# Patient Record
Sex: MALE | Employment: OTHER | URBAN - METROPOLITAN AREA
[De-identification: names, ages, dates, MRNs, and addresses within clinical notes are randomized per-mention and may not be internally consistent; named-entity substitution may affect disease eponyms.]

---

## 2017-02-14 ENCOUNTER — TELEPHONE (OUTPATIENT)
Dept: MEDICAL GROUP | Age: 67
End: 2017-02-14

## 2017-02-14 DIAGNOSIS — K40.90 INGUINAL HERNIA OF RIGHT SIDE WITHOUT OBSTRUCTION OR GANGRENE: ICD-10-CM

## 2017-02-15 NOTE — TELEPHONE ENCOUNTER
Phone Number Called: [phone]    Message: Returned patient call regarding the referral he is requesting for hernia surgery. Left message for patient to call. Need further information for referral. Name of place in [place] and doctor.    Left Message for patient to call back: yes

## 2017-02-15 NOTE — TELEPHONE ENCOUNTER
1. Caller Name: Patient                               Call Back Number: [phone]      Patient approves a detailed voicemail message: yes    He would to like to wait until after April 15th to have surgery, he would like to schedule a consult after that time. He states he has no preference where he is sent as long as its in [place] or [place],also if possible he would like it done laparoscopically Please advise.

## 2017-02-16 NOTE — TELEPHONE ENCOUNTER
Phone Number Called: [phone] (home)     Message: Left message for the patient to call us back regarding the note below.      Left Message for patient to call back: yes

## 2017-02-16 NOTE — TELEPHONE ENCOUNTER
I tried to call patient. No one answer the phone. I need to know the type of hernia. For example inguinal hernia, left or right or Ventral hernia or umbilical hernia. I also needs to know which surgeon in [place] and the contact information of surgeon to refer. Or he is okay to refer to general surgeon in [place].    Thanks!    Yusra Cool M.D.

## 2017-02-17 NOTE — TELEPHONE ENCOUNTER
Phone Number Called: [phone] (home)     Message: called pt left message for pt to call back, [employer] message sent to pt information below.     Left Message for patient to call back: yes

## 2017-02-17 NOTE — TELEPHONE ENCOUNTER
Dr. Cool please review pts [employer] messages, pt sent 4 [employer] messages as the response.

## 2017-02-24 ENCOUNTER — TELEPHONE (OUTPATIENT)
Dept: MEDICAL GROUP | Age: 67
End: 2017-02-24

## 2017-02-24 NOTE — TELEPHONE ENCOUNTER
Phone Number Called: [phone] (home)     Message: Called left message for patient to bring his new insurance card by office so that we can have a recent insurance on file.    Left Message for patient to call back: yes

## 2017-02-27 ENCOUNTER — TELEPHONE (OUTPATIENT)
Dept: MEDICAL GROUP | Age: 67
End: 2017-02-27

## 2017-02-28 NOTE — TELEPHONE ENCOUNTER
Phone Number Called: [phone] (home)       Message: left message for patient he was asking for fax number.    Left Message for patient to call back: yes

## 2017-03-03 ENCOUNTER — HOSPITAL ENCOUNTER (OUTPATIENT)
Facility: MEDICAL CENTER | Age: 67
End: 2017-03-03
Attending: SURGERY | Admitting: SURGERY
Payer: COMMERCIAL

## 2017-03-03 VITALS
HEIGHT: 71 IN | RESPIRATION RATE: 16 BRPM | SYSTOLIC BLOOD PRESSURE: 133 MMHG | WEIGHT: 227.96 LBS | BODY MASS INDEX: 31.91 KG/M2 | HEART RATE: 80 BPM | DIASTOLIC BLOOD PRESSURE: 99 MMHG | OXYGEN SATURATION: 94 % | TEMPERATURE: 98 F

## 2017-03-03 DIAGNOSIS — E78.5 DYSLIPIDEMIA: ICD-10-CM

## 2017-03-03 PROBLEM — K40.20 BILATERAL INGUINAL HERNIA: Status: ACTIVE | Noted: 2017-03-03

## 2017-03-03 PROCEDURE — 160009 HCHG ANES TIME/MIN: Performed by: SURGERY

## 2017-03-03 PROCEDURE — 160047 HCHG PACU  - EA ADDL 30 MINS PHASE II: Performed by: SURGERY

## 2017-03-03 PROCEDURE — 160036 HCHG PACU - EA ADDL 30 MINS PHASE I: Performed by: SURGERY

## 2017-03-03 PROCEDURE — A4606 OXYGEN PROBE USED W OXIMETER: HCPCS | Performed by: SURGERY

## 2017-03-03 PROCEDURE — 500868 HCHG NEEDLE, SURGI(VARES): Performed by: SURGERY

## 2017-03-03 PROCEDURE — 502714 HCHG ROBOTIC SURGERY SERVICES: Performed by: SURGERY

## 2017-03-03 PROCEDURE — 503108 HCHG CANNULA SEAL 8.5-13MM: Performed by: SURGERY

## 2017-03-03 PROCEDURE — A9270 NON-COVERED ITEM OR SERVICE: HCPCS

## 2017-03-03 PROCEDURE — 160002 HCHG RECOVERY MINUTES (STAT): Performed by: SURGERY

## 2017-03-03 PROCEDURE — 700102 HCHG RX REV CODE 250 W/ 637 OVERRIDE(OP)

## 2017-03-03 PROCEDURE — 502240 HCHG MISC OR SUPPLY RC 0272: Performed by: SURGERY

## 2017-03-03 PROCEDURE — 700111 HCHG RX REV CODE 636 W/ 250 OVERRIDE (IP)

## 2017-03-03 PROCEDURE — 160035 HCHG PACU - 1ST 60 MINS PHASE I: Performed by: SURGERY

## 2017-03-03 PROCEDURE — 501838 HCHG SUTURE GENERAL: Performed by: SURGERY

## 2017-03-03 PROCEDURE — 110382 HCHG SHELL REV 271: Performed by: SURGERY

## 2017-03-03 PROCEDURE — C1781 MESH (IMPLANTABLE): HCPCS | Performed by: SURGERY

## 2017-03-03 PROCEDURE — 160041 HCHG SURGERY MINUTES - EA ADDL 1 MIN LEVEL 4: Performed by: SURGERY

## 2017-03-03 PROCEDURE — 110371 HCHG SHELL REV 272: Performed by: SURGERY

## 2017-03-03 PROCEDURE — 503366 HCHG TROCAR, 12X150 KII FIOS Z THR: Performed by: SURGERY

## 2017-03-03 PROCEDURE — 160029 HCHG SURGERY MINUTES - 1ST 30 MINS LEVEL 4: Performed by: SURGERY

## 2017-03-03 PROCEDURE — 160046 HCHG PACU - 1ST 60 MINS PHASE II: Performed by: SURGERY

## 2017-03-03 PROCEDURE — 700101 HCHG RX REV CODE 250

## 2017-03-03 PROCEDURE — 160048 HCHG OR STATISTICAL LEVEL 1-5: Performed by: SURGERY

## 2017-03-03 PROCEDURE — 160025 RECOVERY II MINUTES (STATS): Performed by: SURGERY

## 2017-03-03 DEVICE — MESH PROGRIP LAPROSCOPIC SELF FIXATING (1/CA): Type: IMPLANTABLE DEVICE | Status: FUNCTIONAL

## 2017-03-03 RX ORDER — MORPHINE SULFATE 10 MG/ML
1-4 INJECTION, SOLUTION INTRAMUSCULAR; INTRAVENOUS
Status: DISCONTINUED | OUTPATIENT
Start: 2017-03-03 | End: 2017-03-04 | Stop reason: HOSPADM

## 2017-03-03 RX ORDER — ATORVASTATIN CALCIUM 80 MG/1
80 TABLET, FILM COATED ORAL EVERY EVENING
Qty: 90 TAB | Refills: 3 | Status: SHIPPED | OUTPATIENT
Start: 2017-03-03 | End: 2018-04-03 | Stop reason: SDUPTHER

## 2017-03-03 RX ORDER — MAGNESIUM HYDROXIDE 1200 MG/15ML
LIQUID ORAL
Status: DISCONTINUED | OUTPATIENT
Start: 2017-03-03 | End: 2017-03-03 | Stop reason: HOSPADM

## 2017-03-03 RX ORDER — BUPIVACAINE HYDROCHLORIDE AND EPINEPHRINE 5; 5 MG/ML; UG/ML
INJECTION, SOLUTION EPIDURAL; INTRACAUDAL; PERINEURAL
Status: DISCONTINUED | OUTPATIENT
Start: 2017-03-03 | End: 2017-03-03 | Stop reason: HOSPADM

## 2017-03-03 RX ORDER — KETOROLAC TROMETHAMINE 30 MG/ML
30 INJECTION, SOLUTION INTRAMUSCULAR; INTRAVENOUS EVERY 6 HOURS
Status: DISCONTINUED | OUTPATIENT
Start: 2017-03-03 | End: 2017-03-04 | Stop reason: HOSPADM

## 2017-03-03 RX ORDER — ACETAMINOPHEN 500 MG
1000 TABLET ORAL
COMMUNITY
End: 2018-04-02

## 2017-03-03 RX ORDER — MEPERIDINE HYDROCHLORIDE 25 MG/ML
INJECTION INTRAMUSCULAR; INTRAVENOUS; SUBCUTANEOUS
Status: COMPLETED
Start: 2017-03-03 | End: 2017-03-03

## 2017-03-03 RX ORDER — OXYCODONE HYDROCHLORIDE AND ACETAMINOPHEN 5; 325 MG/1; MG/1
1-2 TABLET ORAL EVERY 4 HOURS PRN
Status: DISCONTINUED | OUTPATIENT
Start: 2017-03-03 | End: 2017-03-04 | Stop reason: HOSPADM

## 2017-03-03 RX ORDER — OXYCODONE HYDROCHLORIDE AND ACETAMINOPHEN 5; 325 MG/1; MG/1
TABLET ORAL
Status: COMPLETED
Start: 2017-03-03 | End: 2017-03-03

## 2017-03-03 RX ORDER — MIDAZOLAM HYDROCHLORIDE 1 MG/ML
INJECTION INTRAMUSCULAR; INTRAVENOUS
Status: DISCONTINUED
Start: 2017-03-03 | End: 2017-03-04 | Stop reason: HOSPADM

## 2017-03-03 RX ORDER — LIDOCAINE HYDROCHLORIDE 10 MG/ML
INJECTION, SOLUTION INFILTRATION; PERINEURAL
Status: COMPLETED
Start: 2017-03-03 | End: 2017-03-03

## 2017-03-03 RX ORDER — ONDANSETRON 2 MG/ML
4 INJECTION INTRAMUSCULAR; INTRAVENOUS
Status: DISCONTINUED | OUTPATIENT
Start: 2017-03-03 | End: 2017-03-04 | Stop reason: HOSPADM

## 2017-03-03 RX ORDER — SODIUM CHLORIDE, SODIUM LACTATE, POTASSIUM CHLORIDE, CALCIUM CHLORIDE 600; 310; 30; 20 MG/100ML; MG/100ML; MG/100ML; MG/100ML
1000 INJECTION, SOLUTION INTRAVENOUS
Status: COMPLETED | OUTPATIENT
Start: 2017-03-03 | End: 2017-03-03

## 2017-03-03 RX ADMIN — SODIUM CHLORIDE, SODIUM LACTATE, POTASSIUM CHLORIDE, CALCIUM CHLORIDE 1000 ML: 600; 310; 30; 20 INJECTION, SOLUTION INTRAVENOUS at 15:05

## 2017-03-03 RX ADMIN — LIDOCAINE HYDROCHLORIDE: 10 INJECTION, SOLUTION INFILTRATION; PERINEURAL at 15:05

## 2017-03-03 RX ADMIN — MEPERIDINE HYDROCHLORIDE 25 MG: 25 INJECTION INTRAMUSCULAR; INTRAVENOUS; SUBCUTANEOUS at 20:15

## 2017-03-03 RX ADMIN — OXYCODONE AND ACETAMINOPHEN 2 TABLET: 5; 325 TABLET ORAL at 21:15

## 2017-03-03 ASSESSMENT — PAIN SCALES - GENERAL
PAINLEVEL_OUTOF10: 4
PAINLEVEL_OUTOF10: 2
PAINLEVEL_OUTOF10: 3
PAINLEVEL_OUTOF10: 3
PAINLEVEL_OUTOF10: 5
PAINLEVEL_OUTOF10: 4

## 2017-03-03 NOTE — TELEPHONE ENCOUNTER
Was the patient seen in the last year in this department? Yes 12/19/2016, next appt 4/19/2017    Does patient have an active prescription for medications requested? Yes     Received Request Via: Pharmacy

## 2017-03-03 NOTE — IP AVS SNAPSHOT
3/3/2017          Lopez Davison [address]  [place] [address]    Dear Lopez:    [facility] wants to ensure your discharge home is safe and you or your loved ones have had all your questions answered regarding your care after you leave the hospital.    You may receive a telephone call within two days of your discharge.  This call is to make certain you understand your discharge instructions as well as ensure we provided you with the best care possible during your stay with us.     The call will only last approximately 3-5 minutes and will be done by a nurse.    Once again, we want to ensure your discharge home is safe and that you have a clear understanding of any next steps in your care.  If you have any questions or concerns, please do not hesitate to contact us, we are here for you.  Thank you for choosing [facility] for your healthcare needs.    Sincerely,    Grover Santana    [facility]

## 2017-03-03 NOTE — IP AVS SNAPSHOT
" Home Care Instructions                                                                                                                Name:Lopez Calvin  Medical Record Number:[id number]  CSN: [id number]    YOB: 1950   Age: 66 y.o.  Sex: male  HT:1.803 m (5' 11\") WT: 103.4 kg (227 lb 15.3 oz)          Admit Date: 3/3/2017     Discharge Date:   Today's Date: 3/3/2017  Attending Doctor:  Chmap Garber M.D.                  Allergies:  Tamiflu                Discharge Instructions         ACTIVITY: Rest and take it easy for the first 24 hours.  A responsible adult is recommended to remain with you during that time.  It is normal to feel sleepy.  We encourage you to not do anything that requires balance, judgment or coordination.    MILD FLU-LIKE SYMPTOMS ARE NORMAL. YOU MAY EXPERIENCE GENERALIZED MUSCLE ACHES, THROAT IRRITATION, HEADACHE AND/OR SOME NAUSEA.    FOR 24 HOURS DO NOT:  Drive, operate machinery or run household appliances.  Drink beer or alcoholic beverages.   Make important decisions or sign legal documents.    SPECIAL INSTRUCTIONS: *    *Hernia Repair  Care After  These instructions give you information on caring for yourself after your procedure. Your doctor may also give you more specific instructions. Call your doctor if you have any problems or questions after your procedure.  HOME CARE   · You may have changes in your poops (bowel movements).  · You may have loose or watery poop (diarrhea).  · You may be not able to poop.  · Your bowels will slowly get back to normal.  · Do not eat any food that makes you sick to your stomach (nauseous). Eat small meals 4 to 6 times a day instead of 3 large ones.  · Do not drink pop. It will give you gas.  · Do not drink alcohol.  · Do not lift anything heavier than 10 pounds. This is about the weight of a gallon of milk.  · Do not do anything that makes you very tired for at least 6 weeks.  · Do not get your wound wet for 2 days.  · You may take a " sponge bath during this time.  · After 2 days you may take a shower. Gently pat your surgical cut (incision) dry with a towel. Do not rub it.  · For men: You may have been given an athletic supporter (scrotal support) before you left the hospital. It holds your scrotum and testicles closer to your body so there is no strain on your wound. Wear the supporter until your doctor tells you that you do not need it anymore.  GET HELP RIGHT AWAY IF:  · You have watery poop, or cannot poop for more than 3 days.  · You feel sick to your stomach or throw up (vomit) more than 2 or 3 times.  · You have temperature by mouth above 102° F (38.9° C).  · You see redness or puffiness (swelling) around your wound.  · You see yellowish white fluid (pus) coming from your wound.  · You see a bulge or bump in your lower belly (abdomen) or near your groin.  · You develop a rash, trouble breathing, or any other symptoms from medicines taken.  MAKE SURE YOU:  · Understand these instructions.  · Will watch your condition.  · Will get help right away if your are not doing well or get worse.  Document Released: 11/30/2009 Document Revised: 03/11/2013 Document Reviewed: 11/30/2009  [employer]® Patient Information ©2014 [employer].  *    DIET: To avoid nausea, slowly advance diet as tolerated, avoiding spicy or greasy foods for the first day.  Add more substantial food to your diet according to your physician's instructions.  Babies can be fed formula or breast milk as soon as they are hungry.  INCREASE FLUIDS AND FIBER TO AVOID CONSTIPATION.    SURGICAL DRESSING/BATHING: *no tub baths or soaking until your surgeon gives approval.**    FOLLOW-UP APPOINTMENT:  A follow-up appointment should be arranged with your doctor.  Call to schedule.    You should CALL YOUR PHYSICIAN if you develop:  Fever greater than 101 degrees F.  Pain not relieved by medication, or persistent nausea or vomiting.  Excessive bleeding (blood soaking through dressing) or  unexpected drainage from the wound.  Extreme redness or swelling around the incision site, drainage of pus or foul smelling drainage.  Inability to urinate or empty your bladder within 8 hours.  Problems with breathing or chest pain.    You should call 911 if you develop problems with breathing or chest pain.  If you are unable to contact your doctor or surgical center, you should go to the nearest emergency room or urgent care center.  Physician's telephone #: **Dr. Garber [phone]*    If any questions arise, call your doctor.  If your doctor is not available, please feel free to call the Surgical Center at [phone].  The Center is open Monday through Friday from 7AM to 7PM.  You can also call the HEALTH HOTLINE open 24 hours/day, 7 days/week and speak to a nurse at [phone], or toll free at [phone].    A registered nurse may call you a few days after your surgery to see how you are doing after your procedure.    MEDICATIONS: Resume taking daily medication.  Take prescribed pain medication with food.  If no medication is prescribed, you may take non-aspirin pain medication if needed.  PAIN MEDICATION CAN BE VERY CONSTIPATING.  Take a stool softener or laxative such as senokot, pericolace, or milk of magnesia if needed.    Prescription given for **Oxycodone*.  Last pain medication given at *9:05 pm**.    If your physician has prescribed pain medication that includes Acetaminophen (Tylenol), do not take additional Acetaminophen (Tylenol) while taking the prescribed medication.    Depression / Suicide Risk    As you are discharged from this [facility] facility, it is important to learn how to keep safe from harming yourself.    Recognize the warning signs:  · Abrupt changes in personality, positive or negative- including increase in energy   · Giving away possessions  · Change in eating patterns- significant weight changes-  positive or negative  · Change in sleeping patterns- unable to sleep  or sleeping all the time   · Unwillingness or inability to communicate  · Depression  · Unusual sadness, discouragement and loneliness  · Talk of wanting to die  · Neglect of personal appearance   · Rebelliousness- reckless behavior  · Withdrawal from people/activities they love  · Confusion- inability to concentrate     If you or a loved one observes any of these behaviors or has concerns about self-harm, here's what you can do:  · Talk about it- your feelings and reasons for harming yourself  · Remove any means that you might use to hurt yourself (examples: pills, rope, extension cords, firearm)  · Get professional help from the community (Mental Health, Substance Abuse, psychological counseling)  · Do not be alone:Call your Safe Contact- someone whom you trust who will be there for you.  · Call your local CRISIS HOTLINE 973-2231 or 371-663-7587  · Call your local Children's Mobile Crisis Response Team [place] (796) 409-2313 or www.HealthSouk  · Call the toll free National Suicide Prevention Hotlines   · National Suicide Prevention Lifeline 600-301-JGPW (5625)  · National Hope Line Network 800-SUICIDE (181-7099)       Medication List      ASK your doctor about these medications        Instructions    acetaminophen 500 MG Tabs   Commonly known as:  TYLENOL    Take 1,000 mg by mouth 1 time daily as needed.   Dose:  1000 mg       aspirin EC 81 MG Tbec   Commonly known as:  ECOTRIN    Take 81 mg by mouth every 48 hours.   Dose:  81 mg       atorvastatin 80 MG tablet   Commonly known as:  LIPITOR    Take 1 Tab by mouth every evening.   Dose:  80 mg       GERITOL PO    Take 1 Tab by mouth every 48 hours.   Dose:  1 Tab       levothyroxine 100 MCG Tabs   Commonly known as:  SYNTHROID    Take 1 Tab by mouth Every morning on an empty stomach.   Dose:  100 mcg       multivitamin Tabs    Take 1 Tab by mouth every 48 hours.   Dose:  1 Tab       NEXIUM 20 MG capsule   Generic drug:  esomeprazole    Take 40 mg by  mouth every morning before breakfast.   Dose:  40 mg       VITAMIN E PO    Take 1 Tab by mouth every 48 hours.   Dose:  1 Tab               Medication Information     Above and/or attached are the medications your physician expects you to take upon discharge. Review all of your home medications and newly ordered medications with your doctor and/or pharmacist. Follow medication instructions as directed by your doctor and/or pharmacist. Please keep your medication list with you and share with your physician. Update the information when medications are discontinued, doses are changed, or new medications (including over-the-counter products) are added; and carry medication information at all times in the event of emergency situations.        Resources     Quit Smoking / Tobacco Use:    I understand the use of any tobacco products increases my chance of suffering from future heart disease or stroke and could cause other illnesses which may shorten my life. Quitting the use of tobacco products is the single most important thing I can do to improve my health. For further information on smoking / tobacco cessation call a Toll Free Quit Line at 1-970.170.3055 (*National Cancer [place]) or 1-797.715.2832 (American Lung Association) or you can access the web based program at www.lungusa.org.    [place] Tobacco Users Help Line:  (636) 215-7779       Toll Free: 1-363.122.1059  Quit Tobacco Program [facility] Management Services [phone]    Crisis Hotline:    [place] Crisis Hotline:  1-759-BWHPPTK or 1-554.622.6435    [place] Crisis Hotline:    1-320.938.7547 or 603-921-9737    Discharge Survey:   Thank you for choosing [facility]. We hope we did everything we could to make your hospital stay a pleasant one. You may be receiving a survey and we would appreciate your time and participation in answering the questions. Your input is very valuable to us in our efforts to improve our service to our patients and their  families.            Signatures     My signature on this form indicates that:    1. I acknowledge receipt and understanding of these Home Care Instruction.  2. My questions regarding this information have been answered to my satisfaction.  3. I have formulated a plan with my discharge nurse to obtain my prescribed medications for home.    __________________________________      __________________________________                   Patient Signature                                 Guardian/Responsible Adult Signature      __________________________________                 __________       ________                       Nurse Signature                                               Date                 Time

## 2017-03-04 NOTE — OR NURSING
"2120 PT AWAKE ALERT REPORTS MINIMAL DISCOMFORT '' NEED TO URINATE\" PT UNABLE TO VOID WITH URINAL. PT TAKEN TO RESTROOM AND UNABLE TO VOID FOR 10 MINUTES. PT RETURNED TO BED AND PROVIDED CALL BELL AND URINAL. RN ON PACU 2 INFORMED. PT V/U AND AGREEMENT. BLADDER ASSESSED AND NOT DISTENDED. DENIES PAIN TO LIGHT PALPATION.   "

## 2017-03-04 NOTE — DISCHARGE INSTRUCTIONS
ACTIVITY: Rest and take it easy for the first 24 hours.  A responsible adult is recommended to remain with you during that time.  It is normal to feel sleepy.  We encourage you to not do anything that requires balance, judgment or coordination.    MILD FLU-LIKE SYMPTOMS ARE NORMAL. YOU MAY EXPERIENCE GENERALIZED MUSCLE ACHES, THROAT IRRITATION, HEADACHE AND/OR SOME NAUSEA.    FOR 24 HOURS DO NOT:  Drive, operate machinery or run household appliances.  Drink beer or alcoholic beverages.   Make important decisions or sign legal documents.    SPECIAL INSTRUCTIONS: *    *Hernia Repair  Care After  These instructions give you information on caring for yourself after your procedure. Your doctor may also give you more specific instructions. Call your doctor if you have any problems or questions after your procedure.  HOME CARE   · You may have changes in your poops (bowel movements).  · You may have loose or watery poop (diarrhea).  · You may be not able to poop.  · Your bowels will slowly get back to normal.  · Do not eat any food that makes you sick to your stomach (nauseous). Eat small meals 4 to 6 times a day instead of 3 large ones.  · Do not drink pop. It will give you gas.  · Do not drink alcohol.  · Do not lift anything heavier than 10 pounds. This is about the weight of a gallon of milk.  · Do not do anything that makes you very tired for at least 6 weeks.  · Do not get your wound wet for 2 days.  · You may take a sponge bath during this time.  · After 2 days you may take a shower. Gently pat your surgical cut (incision) dry with a towel. Do not rub it.  · For men: You may have been given an athletic supporter (scrotal support) before you left the hospital. It holds your scrotum and testicles closer to your body so there is no strain on your wound. Wear the supporter until your doctor tells you that you do not need it anymore.  GET HELP RIGHT AWAY IF:  · You have watery poop, or cannot poop for more than 3  days.  · You feel sick to your stomach or throw up (vomit) more than 2 or 3 times.  · You have temperature by mouth above 102° F (38.9° C).  · You see redness or puffiness (swelling) around your wound.  · You see yellowish white fluid (pus) coming from your wound.  · You see a bulge or bump in your lower belly (abdomen) or near your groin.  · You develop a rash, trouble breathing, or any other symptoms from medicines taken.  MAKE SURE YOU:  · Understand these instructions.  · Will watch your condition.  · Will get help right away if your are not doing well or get worse.  Document Released: 11/30/2009 Document Revised: 03/11/2013 Document Reviewed: 11/30/2009  [employer]® Patient Information ©2014 [employer].  *    DIET: To avoid nausea, slowly advance diet as tolerated, avoiding spicy or greasy foods for the first day.  Add more substantial food to your diet according to your physician's instructions.  Babies can be fed formula or breast milk as soon as they are hungry.  INCREASE FLUIDS AND FIBER TO AVOID CONSTIPATION.    SURGICAL DRESSING/BATHING: *no tub baths or soaking until your surgeon gives approval.**    FOLLOW-UP APPOINTMENT:  A follow-up appointment should be arranged with your doctor.  Call to schedule.    You should CALL YOUR PHYSICIAN if you develop:  Fever greater than 101 degrees F.  Pain not relieved by medication, or persistent nausea or vomiting.  Excessive bleeding (blood soaking through dressing) or unexpected drainage from the wound.  Extreme redness or swelling around the incision site, drainage of pus or foul smelling drainage.  Inability to urinate or empty your bladder within 8 hours.  Problems with breathing or chest pain.    You should call 911 if you develop problems with breathing or chest pain.  If you are unable to contact your doctor or surgical center, you should go to the nearest emergency room or urgent care center.  Physician's telephone #: **Dr. Garber [phone]*    If any  questions arise, call your doctor.  If your doctor is not available, please feel free to call the Surgical Center at [phone].  The Center is open Monday through Friday from 7AM to 7PM.  You can also call the HEALTH HOTLINE open 24 hours/day, 7 days/week and speak to a nurse at [phone], or toll free at [phone].    A registered nurse may call you a few days after your surgery to see how you are doing after your procedure.    MEDICATIONS: Resume taking daily medication.  Take prescribed pain medication with food.  If no medication is prescribed, you may take non-aspirin pain medication if needed.  PAIN MEDICATION CAN BE VERY CONSTIPATING.  Take a stool softener or laxative such as senokot, pericolace, or milk of magnesia if needed.    Prescription given for **Oxycodone*.  Last pain medication given at *9:05 pm**.    If your physician has prescribed pain medication that includes Acetaminophen (Tylenol), do not take additional Acetaminophen (Tylenol) while taking the prescribed medication.    Depression / Suicide Risk    As you are discharged from this [facility] facility, it is important to learn how to keep safe from harming yourself.    Recognize the warning signs:  · Abrupt changes in personality, positive or negative- including increase in energy   · Giving away possessions  · Change in eating patterns- significant weight changes-  positive or negative  · Change in sleeping patterns- unable to sleep or sleeping all the time   · Unwillingness or inability to communicate  · Depression  · Unusual sadness, discouragement and loneliness  · Talk of wanting to die  · Neglect of personal appearance   · Rebelliousness- reckless behavior  · Withdrawal from people/activities they love  · Confusion- inability to concentrate     If you or a loved one observes any of these behaviors or has concerns about self-harm, here's what you can do:  · Talk about it- your feelings and reasons for harming  yourself  · Remove any means that you might use to hurt yourself (examples: pills, rope, extension cords, firearm)  · Get professional help from the community (Mental Health, Substance Abuse, psychological counseling)  · Do not be alone:Call your Safe Contact- someone whom you trust who will be there for you.  · Call your local CRISIS HOTLINE 536-3583 or 572-660-0769  · Call your local Children's Mobile Crisis Response Team [place] (158) 646-6859 or www.[employer]  · Call the toll free National Suicide Prevention Hotlines   · National Suicide Prevention Lifeline 905-504-QPIA (9804)  · National Hope Line Network 800-SUICIDE (045-7452)

## 2017-03-04 NOTE — OR NURSING
Bladder scanned for 90cc.  Pt instructed to drink and call MD if if he can't urinate within 4 hours from now

## 2017-03-04 NOTE — OP REPORT
DATE OF SERVICE:  03/03/2017    PREOPERATIVE DIAGNOSIS:  Bilateral inguinal hernias.    POSTOPERATIVE DIAGNOSIS:  Bilateral indirect inguinal hernias, ____.    OPERATIONS PERFORMED:  Robotic-assisted laparoscopic transperitoneal repair   and bilateral inguinal hernias with ProGrip mesh implantation.    SURGEON:  Champ Garber M.D.    ANESTHESIOLOGIST:  Manolo Carreon M.D.    ANESTHESIA:  General anesthesia.    OPERATIVE NOTE:  The patient is a 66 years of age presents with bilateral   inguinal hernias.  He was felt to be a candidate for elective repair.  The   risks and possible complications of operation were carefully explained to him   in detail.  He understood and accepted these risks and wished to proceed.  He   had a satisfactory preoperative evaluation.  He was taken to the operating   room and placed under anesthesia by Dr. Carreon.  Once anesthetized, his   abdomen was shaved and prepped with ChloraPrep solution and sterile drapes   were applied.  A time-out was affected.  Prophylactic antibiotics were   administered and sequential stockings had been applied as antiembolism   prophylaxis.  Patient had a solution of 0.5% Marcaine with epinephrine   infiltrated in all wounds.  Patient had an infiltration in the left upper   quadrant first.  Incision was made here and the fascia was elevated.  The   patient had a Veress needle inserted.  Saline drop test was permissive to   proceed with step pneumo insufflation.  Once pneumo insufflated, an 8 mm   trocar was placed and then replaced by a 15 mm trocar.  The abdomen was   surveyed.  The patient had no significant abdominal adhesions from previous   appendectomy.  He did have bilateral inguinal hernias.  The patient had an   epigastric 12 mm trocar placed and then 8 mm trocars placed in the right upper   quadrant.  He was placed in Trendelenburg.  The robot was brought in and   docked.  Instrumentation was introduced.  Dr. Garber retired to the console.     Patient had an incision made in the peritoneum on the left.  The hernia   contained the colon.  The patient had the peritoneum opened across the pelvic   outlet above the level of the hernias.  A pocket was then created using   countertraction electrocautery.  Dissection was carried down and medially   across Geronimo's ligament and then laterally to the psoas muscle.  The patient   gradually had reduction of the indirect sac along the internal ring.  This was    from the cord structures.  Electrocautery ____ for hemostasis.  The   cord structures were spared.  There were allowed to lie flat against the   floor of the abdominal wall.  The patient gradually had this pocket completely   developed.  A similar dissection was carried out on the right.  Again, he had   an indirect hernia.  The sac was reduced.  The dissection proceeded   transperitoneal into the preperitoneal space to the anatomic boundaries as   noted on the left.  Once both dissections had been completed ProGrip mesh was   inserted, unfurled and positioned across the pelvic outlet on the left.  Once   satisfactorily positioned, the peritoneum was closed using running 2-0   Stratafix in 2 layers.  The needle from the Stratafix was retrieved.  A second   mesh was inserted on the right.  This was unfurled and also deployed across   pelvic outlet.  The peritoneum was then closed using a 2-0 Stratafix suture in   2 layers.  The patient had the needle then retrieved.  The patient had the   robot undocked.  The patient had the 15 mm trocar and 12 mm trocar site closed   using an Endoclose device and 0 Vicryl suture in the fascia.  Once closed,   pneumoperitoneum was collapsed.  The wounds were irrigated.  The skin was   closed using running 4-0 Monocryl subcuticular and then sealed with Dermabond.    The patient was awakened, extubated, and taken to recovery room in stable   satisfactory condition.  He was given a prescription for oxycodone IR 5 mg    #40.  He was asked to return to see me by next week.  He will be treated on an   ambulatory basis provided he meets discharge criteria at this hour.  The   patient has had an estimated blood loss around 10 mL.  Sponge, instrument, and   needle counts were reported correct.  There were no intraoperative   complications.       ____________________________________     MD MYRTLE SEO / ANA    DD:  03/03/2017 20:06:12  DT:  03/03/2017 21:41:04    D#:  [id number]  Job#:  [id number]    cc: EMIGDIO AGUILAR MD

## 2017-03-04 NOTE — OR SURGEON
Immediate Post-Operative Note      PreOp Diagnosis: bih    PostOp Diagnosis: same    Procedure(s):  INGUINAL HERNIA REPAIR ROBOTIC W/MESH - Wound Class: Clean progrip     Surgeon(s):  Champ Garber M.D.    Anesthesiologist/Type of Anesthesia:  Anesthesiologist: Manolo Carreon M.D./General    Surgical Staff:  Circulator: Ryder Flores R.N.; Juani Salmon R.N.  Relief Circulator: Heaven Santana R.N.  Scrub Person: Faisal Mcneil    Specimen: 0    Estimated Blood Loss: 10    Findings: indirect .left slider    Complications: 0        3/3/2017 8:00 PM Champ Garber

## 2017-03-31 DIAGNOSIS — E03.9 HYPOTHYROIDISM, UNSPECIFIED: ICD-10-CM

## 2017-03-31 RX ORDER — LEVOTHYROXINE SODIUM 0.1 MG/1
100 TABLET ORAL
Qty: 90 TAB | Refills: 3 | Status: SHIPPED | OUTPATIENT
Start: 2017-03-31 | End: 2018-01-09 | Stop reason: SDUPTHER

## 2017-03-31 NOTE — TELEPHONE ENCOUNTER
Pt is requesting refill be sent to mail order.    Was the patient seen in the last year in this department? Yes 12/19/2016    Does patient have an active prescription for medications requested? Yes     Received Request Via: Patient

## 2018-01-09 DIAGNOSIS — E03.8 OTHER SPECIFIED HYPOTHYROIDISM: ICD-10-CM

## 2018-01-09 RX ORDER — LEVOTHYROXINE SODIUM 0.1 MG/1
100 TABLET ORAL
Qty: 90 TAB | Refills: 3 | Status: SHIPPED | OUTPATIENT
Start: 2018-01-09 | End: 2018-04-03 | Stop reason: SDUPTHER

## 2018-01-09 NOTE — TELEPHONE ENCOUNTER
Was the patient seen in the last year in this department? No      Last seen 12/29/2016    Does patient have an active prescription for medications requested? No     Received Request Via: Patient

## 2018-03-16 ENCOUNTER — PATIENT MESSAGE (OUTPATIENT)
Dept: MEDICAL GROUP | Age: 68
End: 2018-03-16

## 2018-04-02 ENCOUNTER — OFFICE VISIT (OUTPATIENT)
Dept: MEDICAL GROUP | Age: 68
End: 2018-04-02
Payer: MEDICARE

## 2018-04-02 VITALS
WEIGHT: 230 LBS | HEART RATE: 94 BPM | DIASTOLIC BLOOD PRESSURE: 80 MMHG | SYSTOLIC BLOOD PRESSURE: 140 MMHG | BODY MASS INDEX: 32.2 KG/M2 | TEMPERATURE: 97.1 F | OXYGEN SATURATION: 90 % | HEIGHT: 71 IN

## 2018-04-02 DIAGNOSIS — K21.9 GASTROESOPHAGEAL REFLUX DISEASE WITHOUT ESOPHAGITIS: ICD-10-CM

## 2018-04-02 DIAGNOSIS — E03.8 OTHER SPECIFIED HYPOTHYROIDISM: ICD-10-CM

## 2018-04-02 DIAGNOSIS — R73.01 IFG (IMPAIRED FASTING GLUCOSE): ICD-10-CM

## 2018-04-02 DIAGNOSIS — Z12.11 SCREENING FOR COLON CANCER: ICD-10-CM

## 2018-04-02 DIAGNOSIS — E78.5 DYSLIPIDEMIA: ICD-10-CM

## 2018-04-02 DIAGNOSIS — E66.9 OBESITY (BMI 30.0-34.9): ICD-10-CM

## 2018-04-02 PROBLEM — K40.20 BILATERAL INGUINAL HERNIA: Status: RESOLVED | Noted: 2017-03-03 | Resolved: 2018-04-02

## 2018-04-02 PROCEDURE — 99214 OFFICE O/P EST MOD 30 MIN: CPT | Performed by: INTERNAL MEDICINE

## 2018-04-02 NOTE — PROGRESS NOTES
Subjective:   Lopez Calvin is a 67 y.o. male here today for evaluation and management of:      IFG (impaired fasting glucose)  Patient is noncompliance with diet and exercise. He still has elevated fasting glucose at 109.    Hypothyroidism, unspecified  He is taking levothyroxine 100 µg every morning. His thyroid function tests within normal. He is clinically euthyroid state.    Thyroid function test done in [facility] on 3/28/18 showed TSH 0.83, free T4, 1.6.    Gastroesophageal reflux disease without esophagitis  Patient states that he is taking Nexium 20 mg twice a day. He is not able to stop taking Nexium as he has severe acid reflux. He drinks one shot of whiskey every evening. I advised patient to avoid acidic food and alcohol. Patient is advised to cut down Nexium to once a day if he tolerates. I have reviewed the potential side effects of chronic use of PPI. Patient is advised to take vitamin D, B12 and magnesium if he needs to continue taking PPI chronically.    Dyslipidemia  Patient is currently taking atorvastatin 80 mg every evening. He denies side effects from taking it. His liver enzymes on recent blood tests on 3/28/18 are within normal. His LDL cholesterol was 82, but triglyceride remains elevated at 162.    Lipid panel on 3/28/18: Total cholesterol 152, HDL 43, triglycerides 162, LDL 82.          Current medicines (including changes today)  Current Outpatient Prescriptions   Medication Sig Dispense Refill   • levothyroxine (SYNTHROID) 100 MCG Tab Take 1 Tab by mouth Every morning on an empty stomach. 90 Tab 3   • atorvastatin (LIPITOR) 80 MG tablet Take 1 Tab by mouth every evening. 90 Tab 3   • aspirin EC (ECOTRIN) 81 MG Tablet Delayed Response Take 81 mg by mouth every 48 hours.     • esomeprazole (NEXIUM) 20 MG capsule Take 40 mg by mouth every morning before breakfast.     • multivitamin (THERAGRAN) Tab Take 1 Tab by mouth every 48 hours.     • Iron-Vitamins (GERITOL PO) Take 1 Tab by mouth  "every 48 hours.     • VITAMIN E PO Take 1 Tab by mouth every 48 hours.       No current facility-administered medications for this visit.      He  has a past medical history of Anesthesia; Arthritis; Cataract; Disorder of thyroid; GERD (gastroesophageal reflux disease); Heart burn; Hiatus hernia syndrome; Hyperlipidemia; Indigestion; Pneumonia (1990); Sleep apnea; and Snoring.    ROS   No chest pain, no shortness of breath, no abdominal pain       Objective:     Blood pressure 140/80, pulse 94, temperature 36.2 °C (97.1 °F), height 1.791 m (5' 10.5\"), weight 104.3 kg (230 lb), SpO2 90 %. Body mass index is 32.54 kg/m².   Physical Exam:  General: Alert, oriented and no acute distress.  Eye contact is good, speech goal directed, affect calm  HEENT: conjunctiva non-injected, sclera non-icteric.  Oral mucous membranes pink and moist with no lesions.  Pinna normal.   Lungs: Normal respiratory effort, clear to auscultation bilaterally with good excursion.  CV: regular rate and rhythm. No murmurs.   Abdomen: soft, non distended, nontender, Bowel sound normal.  Ext: no edema, color normal, vascularity normal, temperature normal        Assessment and Plan:   The following treatment plan was discussed     1. IFG (impaired fasting glucose)  - Stable. I advised patient to try to avoid eating processed sugar and simple carbohydrate. Patient is advised to do regular physical exercise 3-5 times a week.  - COMP METABOLIC PANEL; Future  - HEMOGLOBIN A1C; Future    2. Other specified hypothyroidism  - Well-controlled. Continue current regimens. Recheck lab 1-2 weeks before next follow up visit.  - TSH; Future  - FREE THYROXINE; Future    3. Gastroesophageal reflux disease without esophagitis  - Chronic and stable. We discussed to avoid acidic food and alcohol. Patient is advised to take vitamin B12, magnesium, vitamin D if he is chronically taking PPI. I also reviewed potential side effects of chronic use of PPI with him.  - Patient " is advised to decrease the dose of Nexium from 20 mg twice a day to 20 mg daily if he tolerates and symptoms improve.  - REFERRAL TO GASTROENTEROLOGY    4. Dyslipidemia  - LDL at goal. Triglycerides remain high. Continue atorvastatin 80 mg once every evening. Review potential side effects of atorvastatin with patient.  - Advised to eat low fat, low carbohydrate and high fiber diet as well as do cardio physical exercise regularly.   - Advised to take omega-3 once a day.  - COMP METABOLIC PANEL; Future  - LIPID PROFILE; Future    5. Screening for colon cancer  - Refer to GI for colon cancer screening.  - REFERRAL TO GASTROENTEROLOGY    6. Obesity (BMI 30.0-34.9)  - Counseled for healthy diet and regular physical exercise to lose weight.   - Patient identified as having weight management issue.  Appropriate orders and counseling given.        Followup: Return in about 6 months (around 10/2/2018), or if symptoms worsen or fail to improve, for annual wellness visit.      Please note that this dictation was created using voice recognition software. I have made every reasonable attempt to correct obvious errors, but I expect that there may have unintended errors in text, spelling, punctuation, or grammar that I did not discover.

## 2018-04-02 NOTE — ASSESSMENT & PLAN NOTE
He is taking levothyroxine 100 µg every morning. His thyroid function tests within normal. He is clinically euthyroid state.    Thyroid function test done in [facility] on 3/28/18 showed TSH 0.83, free T4, 1.6.

## 2018-04-02 NOTE — ASSESSMENT & PLAN NOTE
Patient is currently taking atorvastatin 80 mg every evening. He denies side effects from taking it. His liver enzymes on recent blood tests on 3/28/18 are within normal. His LDL cholesterol was 82, but triglyceride remains elevated at 162.    Lipid panel on 3/28/18: Total cholesterol 152, HDL 43, triglycerides 162, LDL 82.

## 2018-04-02 NOTE — ASSESSMENT & PLAN NOTE
Patient states that he is taking Nexium 20 mg twice a day. He is not able to stop taking Nexium as he has severe acid reflux. He drinks one shot of whiskey every evening. I advised patient to avoid acidic food and alcohol. Patient is advised to cut down Nexium to once a day if he tolerates. I have reviewed the potential side effects of chronic use of PPI. Patient is advised to take vitamin D, B12 and magnesium if he needs to continue taking PPI chronically.

## 2018-04-03 DIAGNOSIS — E03.8 OTHER SPECIFIED HYPOTHYROIDISM: ICD-10-CM

## 2018-04-03 DIAGNOSIS — E78.5 DYSLIPIDEMIA: ICD-10-CM

## 2018-04-03 RX ORDER — LEVOTHYROXINE SODIUM 0.1 MG/1
100 TABLET ORAL
Qty: 90 TAB | Refills: 3 | Status: SHIPPED | OUTPATIENT
Start: 2018-04-03 | End: 2019-05-20 | Stop reason: SDUPTHER

## 2018-04-03 RX ORDER — ATORVASTATIN CALCIUM 80 MG/1
80 TABLET, FILM COATED ORAL EVERY EVENING
Qty: 90 TAB | Refills: 3 | Status: SHIPPED | OUTPATIENT
Start: 2018-04-03 | End: 2019-05-11 | Stop reason: SDUPTHER

## 2019-01-24 ENCOUNTER — PATIENT MESSAGE (OUTPATIENT)
Dept: MEDICAL GROUP | Age: 69
End: 2019-01-24

## 2019-05-07 ENCOUNTER — PATIENT MESSAGE (OUTPATIENT)
Dept: MEDICAL GROUP | Age: 69
End: 2019-05-07

## 2019-05-07 DIAGNOSIS — R73.01 IFG (IMPAIRED FASTING GLUCOSE): ICD-10-CM

## 2019-05-07 DIAGNOSIS — E78.00 HYPERCHOLESTEROLEMIA: ICD-10-CM

## 2019-05-07 DIAGNOSIS — E03.9 HYPOTHYROIDISM, UNSPECIFIED TYPE: ICD-10-CM

## 2019-05-08 NOTE — PATIENT COMMUNICATION
Her previous blood test orders were .   I placed new blood test orders for patient today.  Please fax the blood test orders to [facility] in [place], [place] to their Fax Number: [phone].    Please inform patient after we fax the orders.    Please also advise patient to check with her health insurance whether she can do blood test in [place].    She needs to fast for 12-hour before blood test.    Thanks!    Yusra Cool M.D.

## 2019-05-08 NOTE — TELEPHONE ENCOUNTER
From: Lopez Calvin  To: Yusra Cool M.D.  Sent: 5/7/2019 4:28 PM PDT  Subject: Procedure Question    Please fax your Lab Request Order for me, Lopez Calvin, to [facility] in [place], [place] to their Fax Number: [phone]. (I'm currently visiting my daughter in [place].) My appoint with Dr. Cool is scheduled for Monday, May 20 at 10am. If you have any questions, please call [facility] in [place] at [phone] or me on my cell phone at [phone]. Thank you. P.S. MY LAB WORK AT [facility] IN [place] IS SCHEDULED FOR THURS, MAY 16TH AT 8:00AM.

## 2019-05-09 NOTE — TELEPHONE ENCOUNTER
Phone Number Called: [phone] (home)       Message: LVM for patient letting him know of the message below. Rissa faxed over the lab orders to [facility] Message for patient to call back: no

## 2019-05-11 DIAGNOSIS — E78.5 DYSLIPIDEMIA: ICD-10-CM

## 2019-05-13 ENCOUNTER — TELEPHONE (OUTPATIENT)
Dept: MEDICAL GROUP | Age: 69
End: 2019-05-13

## 2019-05-13 RX ORDER — ATORVASTATIN CALCIUM 80 MG/1
TABLET, FILM COATED ORAL
Qty: 90 TAB | Refills: 3 | Status: SHIPPED | OUTPATIENT
Start: 2019-05-13 | End: 2019-08-15 | Stop reason: SDUPTHER

## 2019-05-13 NOTE — TELEPHONE ENCOUNTER
Please let patient know that he is due for blood test and follow up in clinic. I refilled atorvastatin for 90 day supply today. he needs to do fasting blood tests and follow up clinic for further management of medications.    Blood tests were ordered on 5/8/2019.  Please advise patient to fast 12 hours before blood tests. Please advise to do blood test before follow up visit on 5/20/2019.     Thanks!  Yusra Cool M.D.

## 2019-05-13 NOTE — TELEPHONE ENCOUNTER
Phone Number Called: [phone] (home)       Message: lvm for pt. Originally called pt to remind labs need to be completed before next scheduled appt. On 05/20/19 w/.    Left Message for patient to call back: yes

## 2019-05-13 NOTE — TELEPHONE ENCOUNTER
Was the patient seen in the last year in this department? No     Does patient have an active prescription for medications requested? No     Received Request Via: Patient      Rx updated to :     [facility]/pharmacy #[id number] - [place] - [facility] [address] [address]  Phone: [phone] Fax: [phone]

## 2019-05-14 NOTE — TELEPHONE ENCOUNTER
Phone Number Called: [phone] (home)       Message: Called and let patient know of message, Patient will call back to schedule appointment.     Left Message for patient to call back: no

## 2019-05-15 ENCOUNTER — TELEPHONE (OUTPATIENT)
Dept: MEDICAL GROUP | Age: 69
End: 2019-05-15

## 2019-05-15 NOTE — TELEPHONE ENCOUNTER
Phone Number Called: [phone] (home)     Message: received VM from pt. Returned call & lvm. Advised request for Atorvastatin refill was processed to [facility] pharmacy, [place], [place] telephone: [phone]    Left Message for patient to call back: yes, if needing anything else further.

## 2019-05-16 LAB — HBA1C MFR BLD: 14 % (ref 0–5.6)

## 2019-05-20 ENCOUNTER — OFFICE VISIT (OUTPATIENT)
Dept: MEDICAL GROUP | Age: 69
End: 2019-05-20
Payer: MEDICARE

## 2019-05-20 VITALS
HEIGHT: 71 IN | OXYGEN SATURATION: 94 % | HEART RATE: 88 BPM | BODY MASS INDEX: 28.39 KG/M2 | DIASTOLIC BLOOD PRESSURE: 72 MMHG | SYSTOLIC BLOOD PRESSURE: 128 MMHG | WEIGHT: 202.8 LBS | TEMPERATURE: 98.3 F

## 2019-05-20 DIAGNOSIS — G47.33 OSA (OBSTRUCTIVE SLEEP APNEA): ICD-10-CM

## 2019-05-20 DIAGNOSIS — E03.8 OTHER SPECIFIED HYPOTHYROIDISM: ICD-10-CM

## 2019-05-20 DIAGNOSIS — K21.9 GASTROESOPHAGEAL REFLUX DISEASE WITHOUT ESOPHAGITIS: ICD-10-CM

## 2019-05-20 DIAGNOSIS — E11.9 TYPE 2 DIABETES MELLITUS WITHOUT COMPLICATION, WITHOUT LONG-TERM CURRENT USE OF INSULIN (HCC): ICD-10-CM

## 2019-05-20 DIAGNOSIS — E78.5 DYSLIPIDEMIA: ICD-10-CM

## 2019-05-20 PROCEDURE — 99214 OFFICE O/P EST MOD 30 MIN: CPT | Performed by: INTERNAL MEDICINE

## 2019-05-20 RX ORDER — LEVOTHYROXINE SODIUM 0.1 MG/1
100 TABLET ORAL
Qty: 90 TAB | Refills: 3 | Status: SHIPPED | OUTPATIENT
Start: 2019-05-20 | End: 2019-10-10 | Stop reason: SDUPTHER

## 2019-05-20 ASSESSMENT — PAIN SCALES - GENERAL: PAINLEVEL: NO PAIN

## 2019-05-20 ASSESSMENT — PATIENT HEALTH QUESTIONNAIRE - PHQ9: CLINICAL INTERPRETATION OF PHQ2 SCORE: 0

## 2019-05-20 NOTE — PATIENT INSTRUCTIONS
1800 Calorie Diet for Diabetes Meal Planning  The 1800 calorie diet is designed for eating up to 1800 calories each day. Following this diet and making healthy meal choices can help improve overall health. This diet controls blood sugar (glucose) levels and can also help lower blood pressure and cholesterol.  SERVING SIZES  Measuring foods and serving sizes helps to make sure you are getting the right amount of food. The list below tells how big or small some common serving sizes are:  · 1 oz.........4 stacked dice.   · 3 oz.........Deck of cards.   · 1 tsp........Tip of little finger.   · 1 tbs........Thumb.   · 2 tbs........Golf ball.   · ½ cup.......Half of a fist.   · 1 cup........A fist.   GUIDELINES FOR CHOOSING FOODS  The goal of this diet is to eat a variety of foods and limit calories to 1800 each day. This can be done by choosing foods that are low in calories and fat. The diet also suggests eating small amounts of food frequently. Doing this helps control your blood glucose levels so they do not get too high or too low. Each meal or snack may include a protein food source to help you feel more satisfied and to stabilize your blood glucose. Try to eat about the same amount of food around the same time each day. This includes weekend days, travel days, and days off work. Space your meals about 4 to 5 hours apart and add a snack between them if you wish.   For example, a daily food plan could include breakfast, a morning snack, lunch, dinner, and an evening snack. Healthy meals and snacks include whole grains, vegetables, fruits, lean meats, poultry, fish, and dairy products. As you plan your meals, select a variety of foods. Choose from the bread and starch, vegetable, fruit, dairy, and meat/protein groups. Examples of foods from each group and their suggested serving sizes are listed below. Use measuring cups and spoons to become familiar with what a healthy portion looks like.  Bread and Starch  Each  serving equals 15 grams of carbohydrates.  · 1 slice bread.   · ¼ bagel.   · ¾ cup cold cereal (unsweetened).   · ½ cup hot cereal or mashed potatoes.   · 1 small potato (size of a computer mouse).   ·  cup cooked pasta or rice.   · ½ English muffin.   · 1 cup broth-based soup.   · 3 cups of popcorn.   · 4 to 6 whole-wheat crackers.   · ½ cup cooked beans, peas, or corn.   Vegetable  Each serving equals 5 grams of carbohydrates.  · ½ cup cooked vegetables.   · 1 cup raw vegetables.   · ½ cup tomato or vegetable juice.   Fruit  Each serving equals 15 grams of carbohydrates.  · 1 small apple or orange.   · 1¼ cup watermelon or strawberries.   · ½ cup applesauce (no sugar added).   · 2 tbs raisins.   · ½ banana.   · ½ cup canned fruit, packed in water, its own juice, or sweetened with a sugar substitute.   · ½ cup unsweetened fruit juice.   Dairy  Each serving equals 12 to 15 grams of carbohydrates.  · 1 cup fat-free milk.   · 6 oz artificially sweetened yogurt or plain yogurt.   · 1 cup low-fat buttermilk.   · 1 cup soy milk.   · 1 cup almond milk.   Meat/Protein  · 1 large egg.   · 2 to 3 oz meat, poultry, or fish.   · ¼ cup low-fat cottage cheese.   · 1 tbs peanut butter.   · 1 oz low-fat cheese.   · ¼ cup tuna in water.   · ½ cup tofu.   Fat  · 1 tsp oil.   · 1 tsp trans-fat-free margarine.   · 1 tsp butter.   · 1 tsp mayonnaise.   · 2 tbs avocado.   · 1 tbs salad dressing.   · 1 tbs cream cheese.   · 2 tbs sour cream.   SAMPLE 1800 CALORIE DIET PLAN  Breakfast  · ¾ cup unsweetened cereal (1 carb serving).   · 1 cup fat-free milk (1 carb serving).   · 1 slice whole-wheat toast (1 carb serving).   · ½ small banana (1 carb serving).   · 1 scrambled egg.   · 1 tsp trans-fat-free margarine.   Lunch  · Tuna sandwich.   · 2 slices whole-wheat bread (2 carb servings).   · ½ cup canned tuna in water, drained.   · 1 tbs reduced fat mayonnaise.   · 1 stalk celery, chopped.   · 2 slices tomato.   · 1 lettuce leaf.   · 1 cup  carrot sticks.   · 24 to 30 seedless grapes (2 carb servings).   · 6 oz light yogurt (1 carb serving).   Afternoon Snack  · 3 ash cracker squares (1 carb serving).   · Fat-free milk, 1 cup (1 carb serving).   · 1 tbs peanut butter.   Dinner  · 3 oz salmon, broiled with 1 tsp oil.   · 1 cup mashed potatoes (2 carb servings) with 1 tsp trans-fat-free margarine.   · 1 cup fresh or frozen green beans.   · 1 cup steamed asparagus.   · 1 cup fat-free milk (1 carb serving).   Evening Snack  · 3 cups air-popped popcorn (1 carb serving).   · 2 tbs parmesan cheese sprinkled on top.   MEAL PLAN  Use this worksheet to help you make a daily meal plan based on the 1800 calorie diet suggestions. If you are using this plan to help you control your blood glucose, you may interchange carbohydrate-containing foods (dairy, starches, and fruits). Select a variety of fresh foods of varying colors and flavors. The total amount of carbohydrate in your meals or snacks is more important than making sure you include all of the food groups every time you eat. Choose from the following foods to build your day's meals:  · 8 Starches.   · 4 Vegetables.   · 3 Fruits.   · 2 Dairy.   · 6 to 7 oz Meat/Protein.   · Up to 4 Fats.   Your dietician can use this worksheet to help you decide how many servings and which types of foods are right for you.  BREAKFAST  Food Group and Servings / Food Choice  Starch ________________________________________________________  Dairy _________________________________________________________  Fruit _________________________________________________________  Meat/Protein __________________________________________________  Fat ___________________________________________________________  LUNCH  Food Group and Servings / Food Choice  Starch ________________________________________________________  Meat/Protein __________________________________________________  Vegetable  _____________________________________________________  Fruit _________________________________________________________  Dairy _________________________________________________________  Fat ___________________________________________________________  AFTERNOON SNACK  Food Group and Servings / Food Choice  Starch ________________________________________________________  Meat/Protein __________________________________________________  Fruit __________________________________________________________  Dairy _________________________________________________________  DINNER  Food Group and Servings / Food Choice  Starch _________________________________________________________  Meat/Protein ___________________________________________________  Dairy __________________________________________________________  Vegetable ______________________________________________________  Fruit ___________________________________________________________  Fat ____________________________________________________________  EVENING SNACK  Food Group and Servings / Food Choice  Fruit __________________________________________________________  Meat/Protein ___________________________________________________  Dairy __________________________________________________________  Starch _________________________________________________________  DAILY TOTALS  Starch ____________________________  Vegetable _________________________  Fruit _____________________________  Dairy _____________________________  Meat/Protein______________________  Fat _______________________________  Document Released: 07/10/2006 Document Revised: 03/11/2013 Document Reviewed: 11/02/2012  [employer]® Patient Information ©2013 [facility].

## 2019-05-20 NOTE — PROGRESS NOTES
Subjective:   Lopez Calvin is a 68 y.o. male here today for evaluation and management of:      Type 2 diabetes mellitus without complication, without long-term current use of insulin (HCC)  Prior history of impaired fasting glucose and noncompliance with diet and exercise. During his last office visit in 04/2018, he had an elevated fasting glucose of 109. His most recent labs completed at [facility] on 05/16/19 revealed an elevated fasting glucose of 298 and a glycohemoglobin of 14. He has a new diagnosis today of type II diabetes. He will be started on medications to control his glucose: Metformin 500 mg twice daily with meals.  Patient has been drinking alcohol particularly whiskey more recently due to an increase in stress with his spouse being ill. Additionally, diet has been poor with an increase in fast foods. He will attempt to follow a low carbohydrate, low fat, low processed diet and decrease alcohol intake.    Other specified hypothyroidism  Stable, chronic history of hypothyroidism. He is taking Levothyroxine 100 mcg every morning on an empty stomach. Thyroid function tests were completed at [facility] on 05/16/19. TSH within normal limits at 1.78 and free T4 1.5.    Dyslipidemia  Chronic history of dyslipidemia. Currently, the patient is taking Atorvastatin 80 mg every evening. No medication side effects were reported. Lipid panel was completed with [facility] on 05/16/19 and indicated the following: Total cholesterol 175; HDL 42; Triglycerides 171; .  When compared to prior labs dated 03/2018, cholesterol, triglycerides and LDL are increased. Liver enzymes are normal with AST of 15 and ALT of 21. Patient has been drinking alcohol particularly whiskey more recently due to an increase in stress with his spouse being ill. Additionally, diet has been poor with an increase in fast foods.     Gastroesophageal reflux disease without esophagitis  Patient is taking Nexium 20 mg daily. If he discontinues Nexium,  "he experiences severe acid reflux. He attempts to avoid acidic foods.  He is advised to avoid alcohol. He reports occasional alcohol use.     ALEJANDRA (obstructive sleep apnea)  He was previously diagnosed with obstructive sleep apnea. He is requesting a referral to Pulmonology for a repeat sleep study. No prior history of hypertension. Blood pressure is stable today. He denies any associated complaints of chronic fatigue or shortness of breath or headache or leg swellings.       Current medicines (including changes today)  Current Outpatient Prescriptions   Medication Sig Dispense Refill   • metFORMIN (GLUCOPHAGE) 500 MG Tab Take 1 Tab by mouth 2 times a day, with meals. 180 Tab 3   • levothyroxine (SYNTHROID) 100 MCG Tab Take 1 Tab by mouth Every morning on an empty stomach. 90 Tab 3   • atorvastatin (LIPITOR) 80 MG tablet TAKE 1 TABLET BY MOUTH ONCE DAILY IN THE EVENING 90 Tab 3   • aspirin EC (ECOTRIN) 81 MG Tablet Delayed Response Take 81 mg by mouth every 48 hours.     • esomeprazole (NEXIUM) 20 MG capsule Take 40 mg by mouth every morning before breakfast.     • multivitamin (THERAGRAN) Tab Take 1 Tab by mouth every 48 hours.     • Iron-Vitamins (GERITOL PO) Take 1 Tab by mouth every 48 hours.     • VITAMIN E PO Take 1 Tab by mouth every 48 hours.       No current facility-administered medications for this visit.      He  has a past medical history of Anesthesia; Arthritis; Cataract; Disorder of thyroid; GERD (gastroesophageal reflux disease); Heart burn; Hiatus hernia syndrome; Hyperlipidemia; Indigestion; Pneumonia (1990); Sleep apnea; and Snoring.    ROS   Negative for chest pain, shortness of breath or abdominal pain.       Objective:     /72 (BP Location: Right arm, Patient Position: Sitting, BP Cuff Size: Adult)   Pulse 88   Temp 36.8 °C (98.3 °F) (Temporal)   Ht 1.791 m (5' 10.5\")   Wt 92 kg (202 lb 12.8 oz)   SpO2 94%  Body mass index is 28.69 kg/m².     Physical Exam:  General: Alert, oriented " and no acute distress.  Eye contact is good, speech goal directed, affect calm  HEENT: conjunctiva non-injected, sclera non-icteric.  Oral mucous membranes pink and moist with no lesions.  Pinna normal.   Lungs: Normal respiratory effort, clear to auscultation bilaterally with good excursion.  CV: regular rate and rhythm. No murmurs.  Abdomen: soft, non distended, nontender, Bowel sound normal.  Ext: no edema, color normal, vascularity normal, temperature normal    Diabetic foot exam  Monofilament testing with a 10 gram force: sensation intact: intact bilaterally  Visual Inspection: Skin is dry. Feet without maceration, ulcers, fissures.  Pedal pulses: intact bilaterally      Assessment and Plan:   The following treatment plan was discussed:    1. Type 2 diabetes mellitus without complication, without long-term current use of insulin (HCC)  Prior history of impaired fasting glucose. Fasting glucose was significantly elevated at 298 on his most recent labs dated 05/16/19. Glycohemoglobin of 14. New diagnosis today of type II diabetes and will be started on Metformin 500 mg twice daily with meals. Strongly encouraged the patient follow a low carbohydrate, low fat, low processed sugar and high fiber intake. Diabetic diet plan was reviewed with the patient and print out was provided. Recommended he start exercising regularly for weight loss. Plan to reevaluate labs prior to his next appointment.   - Counseled to comply with medication and diet.   - Counseled signs and symptoms of hypoglycemia and management of hypoglycemia.   - Recommend to have retinal eye exam once a year. Referred to Opthalmology.   - Advised to check both feet daily.   - metFORMIN (GLUCOPHAGE) 500 MG Tab; Take 1 Tab by mouth 2 times a day, with meals.  Dispense: 180 Tab; Refill: 3  - Comp Metabolic Panel; Future  - HEMOGLOBIN A1C; Future  - MICROALBUMIN CREAT RATIO URINE; Future  - Diabetic Monofilament Lower Extremity Exam  - REFERRAL TO  OPHTHALMOLOGY    2. Other specified hypothyroidism  Well controlled. Continue current regimen of Levothyroxine 100 mcg every morning on an empty stomach.  TSH within normal limits at 1.78 completed at [facility] on 05/16/19. No change in medication dosage today. Plan to reevaluate labs and asked to complete 1-2 weeks prior to next appointment.  - levothyroxine (SYNTHROID) 100 MCG Tab; Take 1 Tab by mouth Every morning on an empty stomach.  Dispense: 90 Tab; Refill: 3  - CBC WITH DIFFERENTIAL; Future  - TSH; Future  - FREE THYROXINE; Future    3. Dyslipidemia  Chronic, stable history. Continue current regimen of Atorvastatin 80 mg every evening. Lipid panel completed with [facility] on 05/16/19 indicated : Total cholesterol 175; HDL 42; Triglycerides 171; .  When compared to prior labs dated 03/2018, cholesterol, triglycerides and LDL are increased. Recommended the patient follow a low carbohydrate, low fat, low processed sugar diet. Increase fiber and water intake. Strongly encouraged the patient start exercising regularly.  Plan to reevaluate labs and asked to complete 1-2 weeks prior to next appointment.  - Comp Metabolic Panel; Future  - Lipid Profile; Future    4. Gastroesophageal reflux disease without esophagitis  Chronic, stable history. Recommended he avoid acidic foods and alcohol. Recently, he has increased alcohol intake secondary to stressors including his spouse being ill. He was asked to reduce his alcohol intake as well as cigars. Encouraged the patient to take supplements for vitamin B12, magnesium and vitamin D as he is chronically taking a PPI. Potential side effects of chronic PPI use was reviewed with the patient. Continue current regimen of Nexium 20 mg daily.    5. ALEJANDRA (obstructive sleep apnea)  Prior history of ALEJANDRA. Plan to refer to Pulmonology for a sleep study to reevaluate ALEJANDRA status.   - REFERRAL TO SLEEP STUDIES       6. Health Maintenance   Patient is due for a colonoscopy and plans to  complete in [place]. He reports possible hemorrhoids and will discuss with his established GI provider. He was encouraged to utilize Preparation H if he experiences pain or itching. Encouraged the patient to follow a high fiber diet and increase water intake. He may take laxatives to prevent constipation as this will exacerbate his hemorrhoids.   I also referred patient to ophthalmologist for retinal exam.    Follow up: Return in about 3 months (around 8/20/2019), or if symptoms worsen or fail to improve, for Diabetes, Hypothyroid, Hyperlipidemia, GERD, Lab review.      Please note that this dictation was created using voice recognition software. I have made every reasonable attempt to correct obvious errors, but I expect that there may have unintended errors in text, spelling, punctuation, or grammar that I did not discover.    I, Heaven Chaparro (Scribe), am scribing for, and in the presence of, Yusra Cool M.D.    Electronically signed by: Heaven Chaparro (Scribe), 5/20/2019    I, Yusra Cool M.D., personally performed the services described in this documentation, as scribed by Heaven Chaparro in my presence, and it is both accurate and complete.

## 2019-05-20 NOTE — PROGRESS NOTES
Pt sts will have colonoscopy through [facility] sometime in July 2019. Obtained signed consent form.

## 2019-05-20 NOTE — LETTER
[facility]  Yusra Cool M.D.  [address]  [place] [address]  Fax: [phone]   Authorization for Release/Disclosure of   Protected Health Information   Name: JAY DEAN : 1950 SSN: [id number]   Address: [address] [place] [address] [id number]  [place] [address] Phone:    [phone] (home)    I authorize the entity listed below to release/disclose the PHI below to:   [facility] [employer]/Yusra Cool M.D. and Yusra Cool M.D.   Provider or Entity Name:  [facility]   Address   [facility]  [address] , [place] [address] Phone:  [phone]      Fax:        Reason for request: continuity of care   Information to be released:    [ XXX ] LAST COLONOSCOPY,  including any PATH REPORT and follow-up  [ XXX ] LAST FIT/COLOGUARD RESULT [  ] LAST DEXA  [  ] LAST MAMMOGRAM  [  ] LAST PAP  [  ] LAST LABS [  ] RETINA EXAM REPORT  [  ] IMMUNIZATION RECORDS  [  ] Release all info      [  ] Check here and initial the line next to each item to release ALL health information INCLUDING  _____ Care and treatment for drug and / or alcohol abuse  _____ HIV testing, infection status, or AIDS  _____ Genetic Testing    DATES OF SERVICE OR TIME PERIOD TO BE DISCLOSED: _____________  I understand and acknowledge that:  * This Authorization may be revoked at any time by you in writing, except if your health information has already been used or disclosed.  * Your health information that will be used or disclosed as a result of you signing this authorization could be re-disclosed by the recipient. If this occurs, your re-disclosed health information may no longer be protected by State or Federal laws.  * You may refuse to sign this Authorization. Your refusal will not affect your ability to obtain treatment.  * This Authorization becomes effective upon signing and will  on (date) __________.      If no date is indicated, this Authorization will  one (1) year from the signature date.     Name: Lopez Davison English    Signature:   Date:     5/20/2019       PLEASE FAX REQUESTED RECORDS BACK TO: [phone]

## 2019-05-21 ENCOUNTER — TELEPHONE (OUTPATIENT)
Dept: MEDICAL GROUP | Age: 69
End: 2019-05-21

## 2019-05-21 NOTE — TELEPHONE ENCOUNTER
Phone Number Called: [phone] (home)       Call outcome: left message for patient to call back regarding message below

## 2019-05-21 NOTE — TELEPHONE ENCOUNTER
Received a fax from [facility] statin Pt did not get his colonoscopy completed there because Pt had cancelled the procedure.     FYI- See Media

## 2019-05-21 NOTE — TELEPHONE ENCOUNTER
Please call to inform patient that [facility] stating he did not get his colonoscopy completed there because he had cancelled the procedure.    Please ask patient if he wants me to refer him to gastroenterology in [place] for screening colonoscopy.  If he wants to follow with gastroenterology in [place], then I will place a referral for him.    Yusra Cool M.D.

## 2019-07-08 ENCOUNTER — TELEPHONE (OUTPATIENT)
Dept: MEDICAL GROUP | Age: 69
End: 2019-07-08

## 2019-07-08 NOTE — TELEPHONE ENCOUNTER
Phone Number Called: [phone] (home)     Received VM from pt and return call-No answer, LVM. Advised per call to [facility] [place], that Levothyroxine is ready for pickup per pharmacy rep.

## 2019-08-15 DIAGNOSIS — E78.5 DYSLIPIDEMIA: ICD-10-CM

## 2019-08-15 RX ORDER — ATORVASTATIN CALCIUM 80 MG/1
TABLET, FILM COATED ORAL
Qty: 90 TAB | Refills: 3 | Status: SHIPPED | OUTPATIENT
Start: 2019-08-15 | End: 2020-09-17 | Stop reason: SDUPTHER

## 2019-08-22 LAB — HBA1C MFR BLD: 9 % (ref 0–5.6)

## 2019-08-23 DIAGNOSIS — E11.9 TYPE 2 DIABETES MELLITUS WITHOUT COMPLICATION, WITHOUT LONG-TERM CURRENT USE OF INSULIN (HCC): ICD-10-CM

## 2019-08-23 DIAGNOSIS — E03.8 OTHER SPECIFIED HYPOTHYROIDISM: ICD-10-CM

## 2019-08-23 DIAGNOSIS — E78.5 DYSLIPIDEMIA: ICD-10-CM

## 2019-08-28 ENCOUNTER — OFFICE VISIT (OUTPATIENT)
Dept: MEDICAL GROUP | Age: 69
End: 2019-08-28
Payer: MEDICARE

## 2019-08-28 VITALS
HEIGHT: 69 IN | TEMPERATURE: 98.1 F | HEART RATE: 80 BPM | DIASTOLIC BLOOD PRESSURE: 68 MMHG | BODY MASS INDEX: 31.99 KG/M2 | WEIGHT: 216 LBS | OXYGEN SATURATION: 93 % | SYSTOLIC BLOOD PRESSURE: 122 MMHG

## 2019-08-28 DIAGNOSIS — E66.9 OBESITY (BMI 30.0-34.9): ICD-10-CM

## 2019-08-28 DIAGNOSIS — E11.9 TYPE 2 DIABETES MELLITUS WITHOUT COMPLICATION, WITHOUT LONG-TERM CURRENT USE OF INSULIN (HCC): ICD-10-CM

## 2019-08-28 DIAGNOSIS — Z11.59 NEED FOR HEPATITIS C SCREENING TEST: ICD-10-CM

## 2019-08-28 DIAGNOSIS — E78.5 DYSLIPIDEMIA: ICD-10-CM

## 2019-08-28 DIAGNOSIS — E03.8 OTHER SPECIFIED HYPOTHYROIDISM: ICD-10-CM

## 2019-08-28 DIAGNOSIS — Z11.59 NEED FOR HEPATITIS B SCREENING TEST: ICD-10-CM

## 2019-08-28 PROCEDURE — 99214 OFFICE O/P EST MOD 30 MIN: CPT | Performed by: INTERNAL MEDICINE

## 2019-08-28 SDOH — HEALTH STABILITY: MENTAL HEALTH: HOW MANY STANDARD DRINKS CONTAINING ALCOHOL DO YOU HAVE ON A TYPICAL DAY?: 1 OR 2

## 2019-08-28 SDOH — HEALTH STABILITY: MENTAL HEALTH: HOW OFTEN DO YOU HAVE A DRINK CONTAINING ALCOHOL?: 4 OR MORE TIMES A WEEK

## 2019-08-28 SDOH — HEALTH STABILITY: MENTAL HEALTH: HOW OFTEN DO YOU HAVE 6 OR MORE DRINKS ON ONE OCCASION?: NEVER

## 2019-08-28 ASSESSMENT — ACTIVITIES OF DAILY LIVING (ADL): BATHING_REQUIRES_ASSISTANCE: 0

## 2019-08-28 ASSESSMENT — ENCOUNTER SYMPTOMS: GENERAL WELL-BEING: GOOD

## 2019-08-28 ASSESSMENT — PAIN SCALES - GENERAL: PAINLEVEL: NO PAIN

## 2019-08-28 ASSESSMENT — PATIENT HEALTH QUESTIONNAIRE - PHQ9: CLINICAL INTERPRETATION OF PHQ2 SCORE: 0

## 2019-08-28 NOTE — PROGRESS NOTES
Subjective:   Jay Dean is a 68 y.o. male here today for evaluation and management of:      Dyslipidemia  Chronic. Patient reports compliancy with atorvastatin 80 mg QD and denies side effects from this medication. He had normal liver enzymes at [facility] on 08/23/19. We reviewed blood work completed at [facility] on 8/23/19 with lipid panel as follows: total 156, HDL 46, triglycerides 163, LDL 83. He denies current chest pain or shortness of breath.    Type 2 diabetes mellitus without complication, without long-term current use of insulin (HCC)  Chronic. Patient reports compliancy with Metformin 500 mg BID. At initiation of medication he did have some stomach discomfort however he denies any current side effects. Patient completed blood work at [facility] on 08/23/19, which we reviewed in clinic today. Microalbumin ratio was normal at 0.4. A1c was 9.0, improved significantly from 14 on 05/16/19. Patient is refusing to add additional medication at this time, however may consider in 3 months if numbers are not significantly improved. He states he has cut out [employer].    Results for JAY DEAN (MRN [id number]) as of 8/28/2019 12:17   Ref. Range 5/16/2019 00:00 8/22/2019 00:00   Glycohemoglobin Latest Ref Range: 0.0 - 5.6 % 14 (A) 9 (A)       Other specified hypothyroidism  Chronic. Patient reports compliancy with levothyroxine 100 mcg QD prior to eating. He denies any side effects from this medication. We reviewed blood work completed at [facility] on 08/23/19, which indicated TSH 1.11 and free T4 of 1.4. He denies any current symptoms of hypo- or hyperthyroidism.    Need for hepatitis C screening test  Need for hepatitis B screening test  Patient reports wife with history of hepatitis. Patient agrees to screening prior to hepatitis B vaccines.    Current medicines (including changes today)  Current Outpatient Medications   Medication Sig Dispense Refill   • atorvastatin (LIPITOR) 80 MG tablet TAKE 1 TABLET BY MOUTH  "ONCE DAILY IN THE EVENING 90 Tab 3   • metFORMIN (GLUCOPHAGE) 500 MG Tab Take 1 Tab by mouth 2 times a day, with meals. 180 Tab 3   • levothyroxine (SYNTHROID) 100 MCG Tab Take 1 Tab by mouth Every morning on an empty stomach. 90 Tab 3   • aspirin EC (ECOTRIN) 81 MG Tablet Delayed Response Take 81 mg by mouth every 48 hours.     • esomeprazole (NEXIUM) 20 MG capsule Take 40 mg by mouth every morning before breakfast.     • multivitamin (THERAGRAN) Tab Take 1 Tab by mouth every 48 hours.     • Iron-Vitamins (GERITOL PO) Take 1 Tab by mouth every 48 hours.     • VITAMIN E PO Take 1 Tab by mouth every 48 hours.       No current facility-administered medications for this visit.      He  has a past medical history of Anesthesia, Arthritis, Cataract, Disorder of thyroid, GERD (gastroesophageal reflux disease), Heart burn, Hiatus hernia syndrome, Hyperlipidemia, Indigestion, Pneumonia (1990), Sleep apnea, and Snoring.    ROS   No chest pain, no shortness of breath, no abdominal pain       Objective:     /68 (BP Location: Right arm, Patient Position: Sitting, BP Cuff Size: Adult)   Pulse 80   Temp 36.7 °C (98.1 °F) (Temporal)   Ht 1.743 m (5' 8.62\")   Wt 98 kg (216 lb)   SpO2 93%  Body mass index is 32.25 kg/m².   Physical Exam:  General: Alert, oriented and no acute distress.  Eye contact is good, speech goal directed, affect calm  HEENT: conjunctiva non-injected, sclera non-icteric.  Oral mucous membranes pink and moist with no lesions.  Pinna normal.   Lungs: Normal respiratory effort, clear to auscultation bilaterally with good excursion.  CV: regular rate and rhythm. No murmurs.   Abdomen: soft, non distended, nontender, bowel sound normal.  Ext: no edema, color normal, vascularity normal, temperature normal  Musculoskeletal exam: moving all extremities freely      Assessment and Plan:   The following treatment plan was discussed :    1. Dyslipidemia  - Well-controlled. Continue current regimen of " atorvastatin 80 mg QD. Recheck lab 1-2 weeks before next follow up visit.  - Reviewed the risks and benefits of treatment and potential side effects of medication.  - Advised to eat low fat, low carbohydrate and high fiber diet as well as do cardio physical exercise regularly.   - Comp Metabolic Panel; Future  - Lipid Profile; Future    2. Type 2 diabetes mellitus without complication, without long-term current use of insulin (HCC)  - A1c improved but not at goal yet.  Patient declined to increase the dose of metformin and declined to add on new medication.  He wants to continue metformin 500 mg twice daily.  - Counseled to comply with . Patient advised to comply with medication and diet.   - Counseled signs and symptoms of hypoglycemia and management of hypoglycemia.   - Recommend to have retinal eye exam once a year.  - Advised to check both feet daily.   - Comp Metabolic Panel; Future  - HEMOGLOBIN A1C; Future    3. Other specified hypothyroidism  - Patient advised to continue current regimen of levothyroxine 100 mcg every morning on empty stomach.   - Plan to recheck at follow up visit.  - Denies current symptoms of hypo- or hyperthyroidism.    4. Need for hepatitis C screening test  - Patient reports wife with history of hepatitis. Plan for screening blood test prior to hepatitis B vaccine.   - HEP C VIRUS ANTIBODY; Future    5. Need for hepatitis B screening test  - Patient reports wife with history of hepatitis. Plan for screening blood test prior to hepatitis B vaccine.   - HEPATITIS B SURFACE ANTIGEN; Future    6. Obesity (BMI 30.0-34.9)  - Counseled for healthy diet and regular physical exercise to lose weight.  - Patient identified as having weight management issue.  Appropriate orders and counseling given.    7. Health Maintenance   - Patient received initial Shingrix vaccine at pharmacy on 08/09/19. He does plan to complete series.   - Patient advised to receive flu vaccine annually.  - HepB & HepC  screening, see above (#4,5).  - Colonoscopy scheduled for 09/22/19. Records will be requested.    Follow up: Return in about 3 months (around 11/28/2019), or if symptoms worsen or fail to improve, for Hyperlipidemia, Hypothyroid, Diabetes, Lab review.    I, Sherry Bower (Scribe), am scribing for, and in the presence of, Yusra Cool M.D..  ?  Electronically signed by: Sherry Bower (Scribe), 8/28/2019  ?  I, Yusra Cool M.D., personally performed the services described in this documentation, as scribed by Sherry Bower in my presence, and it is both accurate and complete.    Please note that this dictation was created using voice recognition software. I have made every reasonable attempt to correct obvious errors, but I expect that there may have unintended errors in text, spelling, punctuation, or grammar that I did not discover.

## 2019-09-23 ENCOUNTER — TELEPHONE (OUTPATIENT)
Dept: MEDICAL GROUP | Age: 69
End: 2019-09-23

## 2019-09-23 NOTE — TELEPHONE ENCOUNTER
· Upper GI endoscopy & Colonoscopy consultation paperwork from [facility] received.    · Scanned to pt media & hard copy given to .    Dr. Silvina ANTHONY

## 2019-10-10 DIAGNOSIS — E03.8 OTHER SPECIFIED HYPOTHYROIDISM: ICD-10-CM

## 2019-10-10 RX ORDER — LEVOTHYROXINE SODIUM 0.1 MG/1
100 TABLET ORAL
Qty: 90 TAB | Refills: 3 | Status: SHIPPED | OUTPATIENT
Start: 2019-10-10 | End: 2020-12-29 | Stop reason: SDUPTHER

## 2019-12-11 ENCOUNTER — PATIENT MESSAGE (OUTPATIENT)
Dept: MEDICAL GROUP | Age: 69
End: 2019-12-11

## 2019-12-11 NOTE — TELEPHONE ENCOUNTER
From: Lopez Calvin  To: Yusra Cool M.D.  Sent: 12/11/2019 11:32 AM PST  Subject: Non-Urgent Medical Question    Please update my records.     I had the Colonoscopy. It was done by Dr. Isaac Jones [place]. All the info was sent to Dr. Yusra Cool.    I've also had both of the NEW Shingles shots. I've already given you that information.

## 2019-12-11 NOTE — PATIENT COMMUNICATION
Please request the colonoscopy report from Dr. Isaac Jones in [facility].    We have requested it on 5/20/2019 and have not received the report yet.    Yusra Cool M.D.

## 2019-12-16 ENCOUNTER — TELEPHONE (OUTPATIENT)
Dept: MEDICAL GROUP | Age: 69
End: 2019-12-16

## 2019-12-16 NOTE — TELEPHONE ENCOUNTER
· Consultation Report paperwork received from [facility]  Scanned to pt media & given to Dr. Cool.

## 2020-03-03 LAB — HBA1C MFR BLD: 8.1 % (ref 0–5.6)

## 2020-04-27 ENCOUNTER — OFFICE VISIT (OUTPATIENT)
Dept: MEDICAL GROUP | Facility: LAB | Age: 70
End: 2020-04-27
Payer: MEDICARE

## 2020-04-27 VITALS
OXYGEN SATURATION: 97 % | TEMPERATURE: 97.9 F | RESPIRATION RATE: 16 BRPM | DIASTOLIC BLOOD PRESSURE: 70 MMHG | HEART RATE: 64 BPM | WEIGHT: 207 LBS | SYSTOLIC BLOOD PRESSURE: 120 MMHG | HEIGHT: 70 IN | BODY MASS INDEX: 29.63 KG/M2

## 2020-04-27 DIAGNOSIS — F43.10 PTSD (POST-TRAUMATIC STRESS DISORDER): ICD-10-CM

## 2020-04-27 DIAGNOSIS — E11.9 TYPE 2 DIABETES MELLITUS WITHOUT COMPLICATION, WITHOUT LONG-TERM CURRENT USE OF INSULIN (HCC): ICD-10-CM

## 2020-04-27 DIAGNOSIS — J18.9 PNEUMONIA DUE TO INFECTIOUS ORGANISM, UNSPECIFIED LATERALITY, UNSPECIFIED PART OF LUNG: ICD-10-CM

## 2020-04-27 DIAGNOSIS — Z23 NEED FOR VACCINATION: ICD-10-CM

## 2020-04-27 PROCEDURE — 99214 OFFICE O/P EST MOD 30 MIN: CPT | Mod: 25 | Performed by: FAMILY MEDICINE

## 2020-04-27 PROCEDURE — 90746 HEPB VACCINE 3 DOSE ADULT IM: CPT | Performed by: FAMILY MEDICINE

## 2020-04-27 PROCEDURE — G0010 ADMIN HEPATITIS B VACCINE: HCPCS | Performed by: FAMILY MEDICINE

## 2020-04-27 ASSESSMENT — PATIENT HEALTH QUESTIONNAIRE - PHQ9: CLINICAL INTERPRETATION OF PHQ2 SCORE: 0
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
## 2020-06-29 DIAGNOSIS — Z20.822 EXPOSURE TO COVID-19 VIRUS: ICD-10-CM

## 2020-07-06 ENCOUNTER — OFFICE VISIT (OUTPATIENT)
Dept: MEDICAL GROUP | Facility: LAB | Age: 70
End: 2020-07-06
Payer: MEDICARE

## 2020-07-06 VITALS
BODY MASS INDEX: 29.62 KG/M2 | RESPIRATION RATE: 16 BRPM | TEMPERATURE: 97.1 F | HEART RATE: 98 BPM | HEIGHT: 69 IN | DIASTOLIC BLOOD PRESSURE: 70 MMHG | SYSTOLIC BLOOD PRESSURE: 120 MMHG | OXYGEN SATURATION: 97 % | WEIGHT: 200 LBS

## 2020-07-06 DIAGNOSIS — G47.33 OSA (OBSTRUCTIVE SLEEP APNEA): ICD-10-CM

## 2020-07-06 DIAGNOSIS — Z23 NEED FOR VACCINATION: ICD-10-CM

## 2020-07-06 DIAGNOSIS — E11.9 TYPE 2 DIABETES MELLITUS WITHOUT COMPLICATION, WITHOUT LONG-TERM CURRENT USE OF INSULIN (HCC): ICD-10-CM

## 2020-07-06 PROCEDURE — 99214 OFFICE O/P EST MOD 30 MIN: CPT | Mod: 25 | Performed by: FAMILY MEDICINE

## 2020-07-06 PROCEDURE — G0010 ADMIN HEPATITIS B VACCINE: HCPCS | Performed by: FAMILY MEDICINE

## 2020-07-06 PROCEDURE — 90746 HEPB VACCINE 3 DOSE ADULT IM: CPT | Performed by: FAMILY MEDICINE

## 2020-07-06 NOTE — PROGRESS NOTES
Subjective:     CC: Sleep Apnea    HPI:   Lopez presents today with    ALEJANDRA:  This is a chronic stable issue.  Patient with a history of obstructive sleep apnea.  As he is lost weight he has found less and less need for his CPAP but he would like to make sure that this is the case.  He currently uses a CPAP and he also uses Lunesta as needed.  He finds that his sleep is also improved since his weight loss he does not quite need the sleeping pills anymore.  He states his wife no longer complains of his snoring or awakenings.    Past Medical History:   Diagnosis Date   • Anesthesia     aggressive after eye surgery   • Arthritis     back   • Cataract    • Disorder of thyroid     hypothyroid   • GERD (gastroesophageal reflux disease)    • Heart burn     lower back   • Hiatus hernia syndrome    • Hyperlipidemia    • Indigestion    • Pneumonia    • Sleep apnea    • Snoring        Social History     Tobacco Use   • Smoking status: Light Tobacco Smoker     Packs/day: 0.25     Years: 30.00     Pack years: 7.50     Types: Cigars     Last attempt to quit: 2013     Years since quittin.5   • Smokeless tobacco: Never Used   • Tobacco comment: 3 cigars per week   Substance Use Topics   • Alcohol use: Yes     Alcohol/week: 4.8 oz     Types: 7 Glasses of wine, 1 Shots of liquor per week     Frequency: 4 or more times a week     Drinks per session: 1 or 2     Binge frequency: Never   • Drug use: Yes     Types: Marijuana     Comment: CBD oil       Current Outpatient Medications Ordered in Epic   Medication Sig Dispense Refill   • levothyroxine (SYNTHROID) 100 MCG Tab Take 1 Tab by mouth Every morning on an empty stomach. 90 Tab 3   • atorvastatin (LIPITOR) 80 MG tablet TAKE 1 TABLET BY MOUTH ONCE DAILY IN THE EVENING 90 Tab 3   • metFORMIN (GLUCOPHAGE) 500 MG Tab Take 1 Tab by mouth 2 times a day, with meals. 180 Tab 3   • aspirin EC (ECOTRIN) 81 MG Tablet Delayed Response Take 81 mg by mouth every 48 hours.     •  "esomeprazole (NEXIUM) 20 MG capsule Take 40 mg by mouth every morning before breakfast.     • multivitamin (THERAGRAN) Tab Take 1 Tab by mouth every 48 hours.     • Iron-Vitamins (GERITOL PO) Take 1 Tab by mouth every 48 hours.     • VITAMIN E PO Take 1 Tab by mouth every 48 hours.       No current [facility]-ordered facility-administered medications on file.        Allergies:  Azithromycin; Cefpodoxime; Levofloxacin; Oseltamivir; and Tamiflu      ROS:  Gen: no fevers/chill, no changes in weight  Eyes: no changes in vision  ENT: no sore throat, no hearing loss, no bloody nose  Pulm: no sob, no cough  CV: no chest pain, no palpitations  GI: no nausea/vomiting, no diarrhea  : no dysuria  MSk: no myalgias  Skin: no rash  Neuro: no headaches, no numbness/tingling  Heme/Lymph: no easy bruising      Objective:       Exam:  /70 (BP Location: Right arm, Patient Position: Sitting, BP Cuff Size: Adult)   Pulse 98   Temp 36.2 °C (97.1 °F) (Temporal)   Resp 16   Ht 1.765 m (5' 9.49\")   Wt 90.7 kg (200 lb)   SpO2 97%   BMI 29.12 kg/m²  Body mass index is 29.12 kg/m².    Gen: Alert and oriented, No apparent distress.  Neck: Neck is supple without lymphadenopathy.  Lungs: Normal effort, CTA bilaterally, no wheezes, rhonchi, or rales  CV: Regular rate and rhythm. No murmurs, rubs, or gallops.               Ext: No clubbing, cyanosis, edema.    Assessment & Plan:     69 y.o. male with the following -     1. Type 2 diabetes mellitus without complication, without long-term current use of insulin ([facility])  Follow-up hemoglobin A1c  - Diabetic Monofilament Lower Extremity Exam    2. Need for vaccination  Administered today  - Hep B Adult 20+    3. ALEJANDRA (obstructive sleep apnea)  Referral placed  - REFERRAL TO PULMONARY AND SLEEP MEDICINE Sleep Medicine        Please note that this dictation was created using voice recognition software. I have made every reasonable attempt to correct obvious errors, but I expect that there are " errors of grammar and possibly content that I did not discover before finalizing the note.

## 2020-07-09 ENCOUNTER — OFFICE VISIT (OUTPATIENT)
Dept: MEDICAL GROUP | Facility: LAB | Age: 70
End: 2020-07-09
Payer: MEDICARE

## 2020-07-09 VITALS
HEART RATE: 100 BPM | TEMPERATURE: 97.3 F | SYSTOLIC BLOOD PRESSURE: 130 MMHG | BODY MASS INDEX: 29.41 KG/M2 | DIASTOLIC BLOOD PRESSURE: 92 MMHG | OXYGEN SATURATION: 93 % | HEIGHT: 69 IN | WEIGHT: 198.6 LBS

## 2020-07-09 DIAGNOSIS — E11.9 TYPE 2 DIABETES MELLITUS WITHOUT COMPLICATION, WITHOUT LONG-TERM CURRENT USE OF INSULIN (HCC): ICD-10-CM

## 2020-07-09 PROCEDURE — 99214 OFFICE O/P EST MOD 30 MIN: CPT | Performed by: FAMILY MEDICINE

## 2020-07-09 RX ORDER — GLIPIZIDE 5 MG/1
5 TABLET ORAL DAILY
Qty: 90 TAB | Refills: 3 | Status: SHIPPED | OUTPATIENT
Start: 2020-07-09 | End: 2021-07-19

## 2020-07-09 RX ORDER — LANCETS 30 GAUGE
EACH MISCELLANEOUS
Qty: 100 EACH | Refills: 0 | Status: SHIPPED | OUTPATIENT
Start: 2020-07-09 | End: 2021-08-31

## 2020-07-09 NOTE — PROGRESS NOTES
Subjective:     CC: DM2    HPI:   Lopez presents today with     DM2:  This is a chronic unstable issue.  Patient is here for follow-up labs.  His lipid panel on a statin is within range, his urine microalbumin is within range, CMP is within range, PSA within range, and thyroid within range.  His hemoglobin A1c however has significantly increased to 10.7.  He admits to drinking multiple Glucerna drinks in a day.  He also admits to missing doses of his metformin.  In the past he has refused medications but today he states he is willing to be compliant and increase his medications as needed.  He denies any polyphagia, polyuria, polydipsia or neuropathy.    Past Medical History:   Diagnosis Date   • Anesthesia     aggressive after eye surgery   • Arthritis     back   • Cataract    • Disorder of thyroid     hypothyroid   • GERD (gastroesophageal reflux disease)    • Heart burn     lower back   • Hiatus hernia syndrome    • Hyperlipidemia    • Indigestion    • Pneumonia    • Sleep apnea    • Snoring        Social History     Tobacco Use   • Smoking status: Light Tobacco Smoker     Packs/day: 0.25     Years: 30.00     Pack years: 7.50     Types: Cigars     Last attempt to quit: 2013     Years since quittin.5   • Smokeless tobacco: Never Used   • Tobacco comment: 3 cigars per week   Substance Use Topics   • Alcohol use: Yes     Alcohol/week: 4.8 oz     Types: 7 Glasses of wine, 1 Shots of liquor per week     Frequency: 4 or more times a week     Drinks per session: 1 or 2     Binge frequency: Never   • Drug use: Yes     Types: Marijuana     Comment: CBD oil       Current Outpatient Medications Ordered in Epic   Medication Sig Dispense Refill   • metFORMIN (GLUCOPHAGE) 500 MG Tab Take 1 Tab by mouth 2 times a day, with meals. 180 Tab 3   • glipiZIDE (GLUCOTROL) 5 MG Tab Take 1 Tab by mouth every day. 90 Tab 3   • Blood Glucose Meter Kit Test blood sugar as recommended by provider. Accucheck Taty blood glucose  "monitoring kit. 1 Kit 0   • Blood Glucose Test Strips Use one Accucheck Taty strip to test blood sugar once daily . 100 Strip 0   • Lancets Use one Accucheck Taty lancet to test blood sugar once daily . 100 Each 0   • levothyroxine (SYNTHROID) 100 MCG Tab Take 1 Tab by mouth Every morning on an empty stomach. 90 Tab 3   • atorvastatin (LIPITOR) 80 MG tablet TAKE 1 TABLET BY MOUTH ONCE DAILY IN THE EVENING 90 Tab 3   • aspirin EC (ECOTRIN) 81 MG Tablet Delayed Response Take 81 mg by mouth every 48 hours.     • esomeprazole (NEXIUM) 20 MG capsule Take 40 mg by mouth every morning before breakfast.     • multivitamin (THERAGRAN) Tab Take 1 Tab by mouth every 48 hours.     • Iron-Vitamins (GERITOL PO) Take 1 Tab by mouth every 48 hours.     • VITAMIN E PO Take 1 Tab by mouth every 48 hours.       No current [facility]-ordered facility-administered medications on file.        Allergies:  Azithromycin; Cefpodoxime; Levofloxacin; Oseltamivir; and Tamiflu    ROS:  Gen: no fevers/chill, no changes in weight  Eyes: no changes in vision  ENT: no sore throat, no hearing loss, no bloody nose  Pulm: no sob, no cough  CV: no chest pain, no palpitations  GI: no nausea/vomiting, no diarrhea  : no dysuria  MSk: no myalgias  Skin: no rash  Neuro: no headaches, no numbness/tingling  Heme/Lymph: no easy bruising      Objective:       Exam:  /92 (BP Location: Left arm, Patient Position: Sitting)   Pulse 100   Temp 36.3 °C (97.3 °F) (Temporal)   Ht 1.765 m (5' 9.49\")   Wt 90.1 kg (198 lb 9.6 oz)   SpO2 93%   BMI 28.92 kg/m²  Body mass index is 28.92 kg/m².    Gen: Alert and oriented, No apparent distress.  Neck: Neck is supple without lymphadenopathy.  Lungs: Normal effort, CTA bilaterally, no wheezes, rhonchi, or rales  CV: Regular rate and rhythm. No murmurs, rubs, or gallops.               Ext: No clubbing, cyanosis, edema.  Diabetic foot exam: No lesions or calluses noted. 2+ pedal pulses. Sensation intact with 10 out of 10 " on monofilament test.      Assessment & Plan:     69 y.o. male with the following -     1. Type 2 diabetes mellitus without complication, without long-term current use of insulin ([facility])  Discussed hypoglycemia and symptomatic highs.  I am placing him on 500 mg of metformin twice daily and discussed the need for compliance.  I also will be adding 5 mg only once daily if the glipizide as not to overly treat.  I will have patient return but he cannot until after 5 months.  I did discuss drinking Glucerna at a minimum given this is generally a meal replacement for people on insulin as it has a high carb count.  I would like him to maintain a healthy diet which he is attempting to do as he has lost 7 pounds in the last saw him.  Strict ER and follow-up precautions discussed.  Patient to return for management.  - metFORMIN (GLUCOPHAGE) 500 MG Tab; Take 1 Tab by mouth 2 times a day, with meals.  Dispense: 180 Tab; Refill: 3        Please note that this dictation was created using voice recognition software. I have made every reasonable attempt to correct obvious errors, but I expect that there are errors of grammar and possibly content that I did not discover before finalizing the note.

## 2020-07-20 ENCOUNTER — TELEPHONE (OUTPATIENT)
Dept: MEDICAL GROUP | Facility: LAB | Age: 70
End: 2020-07-20

## 2020-07-20 NOTE — TELEPHONE ENCOUNTER
1. Caller Name Lopez Calvin                  Call Back Number [phone] (home)       How would the patient prefer to be contacted with a response:     Pt LVM asking 3 questions  1. Can he take glipizide alone w/o the metformin. He is feeling well with just this. The metformin does not agree with him?  2. Pt has COVID antibody test  2 wks ago and never got results. Had done at [facility]. They are scanned into [facility] 7/9  3.pt has an appt with Dr. Adán Sandoval for hernia Aug 20, [facility] in [place].

## 2020-07-21 NOTE — TELEPHONE ENCOUNTER
Yes he can try glipizide alone for a little while with metformin does not agree with him but based on his last hemoglobin A1c, he needs to follow a strict diabetic diet/avoid those white carbohydrates.    Good news that he has an appointment with Dr. Garber for his hernia.    Antibodies are negative.

## 2020-07-21 NOTE — TELEPHONE ENCOUNTER
He needs to contact [facility] because it says specimen was not received or processed for his COVID antibodies.

## 2020-09-17 DIAGNOSIS — E78.5 DYSLIPIDEMIA: ICD-10-CM

## 2020-09-17 NOTE — TELEPHONE ENCOUNTER
Received request via: Pharmacy    Was the patient seen in the last year in this department? Yes  7/9/20  Does the patient have an active prescription (recently filled or refills available) for medication(s) requested? No

## 2020-09-18 RX ORDER — ATORVASTATIN CALCIUM 80 MG/1
TABLET, FILM COATED ORAL
Qty: 90 TAB | Refills: 3 | Status: SHIPPED | OUTPATIENT
Start: 2020-09-18 | End: 2021-07-19

## 2020-12-28 ENCOUNTER — TELEPHONE (OUTPATIENT)
Dept: MEDICAL GROUP | Facility: LAB | Age: 70
End: 2020-12-28

## 2020-12-28 NOTE — TELEPHONE ENCOUNTER
Received a call from this patient requesting a RF of the medication Levothyroxine. Patient does have an upcoming appt scheduled for 1/7/2021 with crystal Xavier. Did LM for patient to call back and confirm as to whether is he completely out of med at this time since appt is next week and he can get RF at that time. Waiting for call back.

## 2020-12-29 DIAGNOSIS — E03.8 OTHER SPECIFIED HYPOTHYROIDISM: ICD-10-CM

## 2020-12-29 RX ORDER — LEVOTHYROXINE SODIUM 0.1 MG/1
100 TABLET ORAL
Qty: 90 TAB | Refills: 3 | Status: SHIPPED | OUTPATIENT
Start: 2020-12-29 | End: 2021-08-31 | Stop reason: SDUPTHER

## 2020-12-29 NOTE — TELEPHONE ENCOUNTER
Received request via: Patient    Was the patient seen in the last year in this department? Yes  7/9/20  Does the patient have an active prescription (recently filled or refills available) for medication(s) requested? No

## 2021-01-29 ENCOUNTER — TELEPHONE (OUTPATIENT)
Dept: MEDICAL GROUP | Facility: LAB | Age: 71
End: 2021-01-29

## 2021-01-29 DIAGNOSIS — E78.5 DYSLIPIDEMIA: ICD-10-CM

## 2021-01-29 DIAGNOSIS — E11.9 TYPE 2 DIABETES MELLITUS WITHOUT COMPLICATION, WITHOUT LONG-TERM CURRENT USE OF INSULIN (HCC): ICD-10-CM

## 2021-01-29 DIAGNOSIS — E03.8 OTHER SPECIFIED HYPOTHYROIDISM: ICD-10-CM

## 2021-01-29 NOTE — TELEPHONE ENCOUNTER
1. Caller Name: Lopez Calvin                        Call Back Number: [phone] (home)       How would the patient prefer to be contacted with a response:     Pt would like labs prior to new appt in April. Dr. Dodd has placed labs, would yo like to use them or add on? Pt stopped his glipizide/ about a week and Nexium since surgery. He had a stomach surgery 11/20 at [facility]

## 2021-02-22 ENCOUNTER — NURSE TRIAGE (OUTPATIENT)
Dept: HEALTH INFORMATION MANAGEMENT | Facility: OTHER | Age: 71
End: 2021-02-22

## 2021-03-03 DIAGNOSIS — Z23 NEED FOR VACCINATION: ICD-10-CM

## 2021-04-12 ENCOUNTER — TELEPHONE (OUTPATIENT)
Dept: MEDICAL GROUP | Facility: LAB | Age: 71
End: 2021-04-12

## 2021-04-12 NOTE — TELEPHONE ENCOUNTER
Left message for patient to call back regarding pre-visit planning. Please transfer call to [phone]

## 2021-04-12 NOTE — TELEPHONE ENCOUNTER
NEW PATIENT VISIT PRE-VISIT PLANNING    1.  [employer] Patient is checked in Patient Demographics?Yes    2.  Immunizations were updated in Epic using Reconcile Outside Information activity? Yes         3.  Is this appointment scheduled as a Hospital Follow-Up? No    4.  Patient is due for the following Health Maintenance Topics:   Health Maintenance Due   Topic Date Due   • Annual Wellness Visit  Never done   • HEPATITIS C SCREENING  Never done   • URINE ACR / MICROALBUMIN  08/22/2020   • A1C SCREENING  09/03/2020   • IMM HEP B VACCINE (3 of 3 - Risk 3-dose series) 11/06/2020   • FASTING LIPID PROFILE  03/03/2021   • SERUM CREATININE  03/03/2021   • RETINAL SCREENING  04/27/2021       5.  Reviewed/Updated the following with patient:       •   Preferred Pharmacy? No       •   Preferred Lab? No       •   Preferred Communication? No       •   Allergies? No       •   Medications? NO        6.  Updated Care Team?       •   DME Company (gait device, O2, CPAP, etc.) NO       •   Other Specialists (eye doctor, derm, GYN, cardiology, endo, etc): NO    7.  AHA (Puls[employer]) form printed for Provider? N/A

## 2021-04-19 ENCOUNTER — OFFICE VISIT (OUTPATIENT)
Dept: MEDICAL GROUP | Facility: LAB | Age: 71
End: 2021-04-19
Payer: MEDICARE

## 2021-04-19 VITALS
HEART RATE: 88 BPM | HEIGHT: 69 IN | SYSTOLIC BLOOD PRESSURE: 130 MMHG | TEMPERATURE: 97.2 F | RESPIRATION RATE: 18 BRPM | WEIGHT: 200 LBS | DIASTOLIC BLOOD PRESSURE: 80 MMHG | OXYGEN SATURATION: 95 % | BODY MASS INDEX: 29.62 KG/M2

## 2021-04-19 DIAGNOSIS — E03.8 OTHER SPECIFIED HYPOTHYROIDISM: ICD-10-CM

## 2021-04-19 DIAGNOSIS — E78.5 DYSLIPIDEMIA: ICD-10-CM

## 2021-04-19 DIAGNOSIS — E11.9 TYPE 2 DIABETES MELLITUS WITHOUT COMPLICATION, WITHOUT LONG-TERM CURRENT USE OF INSULIN (HCC): ICD-10-CM

## 2021-04-19 DIAGNOSIS — Z76.89 ENCOUNTER TO ESTABLISH CARE WITH NEW DOCTOR: ICD-10-CM

## 2021-04-19 PROCEDURE — 99214 OFFICE O/P EST MOD 30 MIN: CPT | Performed by: INTERNAL MEDICINE

## 2021-04-19 RX ORDER — LORAZEPAM 1 MG/1
TABLET ORAL
COMMUNITY
Start: 2020-10-28 | End: 2021-07-19

## 2021-04-19 RX ORDER — TAMSULOSIN HYDROCHLORIDE 0.4 MG/1
0.4 CAPSULE ORAL DAILY
COMMUNITY
Start: 2020-09-23 | End: 2021-07-19

## 2021-04-19 RX ORDER — ESZOPICLONE 3 MG/1
3 TABLET, FILM COATED ORAL
COMMUNITY
Start: 2020-10-28 | End: 2021-07-19

## 2021-04-19 ASSESSMENT — PATIENT HEALTH QUESTIONNAIRE - PHQ9: CLINICAL INTERPRETATION OF PHQ2 SCORE: 0

## 2021-04-20 NOTE — PROGRESS NOTES
CC: Lopez Calvin is a 70 y.o. male is suffering from   Chief Complaint   Patient presents with   • Establish Care         SUBJECTIVE:  1. Encounter to establish care with new doctor  Torres is here for establishment of care, is a , states that he is continuing to practice, states that he is doing well.  Patient is a type II diabetic.    2. Dyslipidemia  Orders written to recheck levels    3. Type 2 diabetes mellitus without complication, without long-term current use of insulin (HCC)  Continue current medical therapy    4. Other specified hypothyroidism  Recheck free T4 TSH        Past social, family, history:   Social History     Tobacco Use   • Smoking status: Light Tobacco Smoker     Packs/day: 0.25     Years: 30.00     Pack years: 7.50     Types: Cigars     Last attempt to quit: 2013     Years since quittin.3   • Smokeless tobacco: Never Used   • Tobacco comment: 3 cigars per week   Substance Use Topics   • Alcohol use: Yes     Alcohol/week: 4.8 oz     Types: 7 Glasses of wine, 1 Shots of liquor per week   • Drug use: Yes     Types: Marijuana     Comment: CBD oil         MEDICATIONS:    Current Outpatient Medications:   •  Eszopiclone 3 MG Tab, Take 3 mg by mouth., Disp: , Rfl:   •  LORazepam (ATIVAN) 1 MG Tab, 1/2 or one tablet po q day prn anxiety.  7 pills must last 30 days., Disp: , Rfl:   •  tamsulosin (FLOMAX) 0.4 MG capsule, Take 0.4 mg by mouth every day., Disp: , Rfl:   •  levothyroxine (SYNTHROID) 100 MCG Tab, Take 1 Tab by mouth Every morning on an empty stomach., Disp: 90 Tab, Rfl: 3  •  atorvastatin (LIPITOR) 80 MG tablet, TAKE 1 TABLET BY MOUTH ONCE DAILY IN THE EVENING, Disp: 90 Tab, Rfl: 3  •  aspirin EC (ECOTRIN) 81 MG Tablet Delayed Response, Take 81 mg by mouth every 48 hours., Disp: , Rfl:   •  metFORMIN (GLUCOPHAGE) 500 MG Tab, Take 1 Tab by mouth 2 times a day, with meals. (Patient not taking: Reported on 2021), Disp: 180 Tab, Rfl: 3  •  glipiZIDE  (GLUCOTROL) 5 MG Tab, Take 1 Tab by mouth every day. (Patient not taking: Reported on 4/19/2021), Disp: 90 Tab, Rfl: 3  •  Blood Glucose Meter Kit, Test blood sugar as recommended by provider. Accucheck Taty blood glucose monitoring kit., Disp: 1 Kit, Rfl: 0  •  Blood Glucose Test Strips, Use one Accucheck Taty strip to test blood sugar once daily ., Disp: 100 Strip, Rfl: 0  •  Lancets, Use one Accucheck Taty lancet to test blood sugar once daily ., Disp: 100 Each, Rfl: 0  •  esomeprazole (NEXIUM) 20 MG capsule, Take 40 mg by mouth every morning before breakfast., Disp: , Rfl:   •  multivitamin (THERAGRAN) Tab, Take 1 Tab by mouth every 48 hours., Disp: , Rfl:   •  Iron-Vitamins (GERITOL PO), Take 1 Tab by mouth every 48 hours., Disp: , Rfl:   •  VITAMIN E PO, Take 1 Tab by mouth every 48 hours., Disp: , Rfl:     PROBLEMS:  Patient Active Problem List    Diagnosis Date Noted   • Type 2 diabetes mellitus without complication, without long-term current use of insulin ([facility]) 05/20/2019   • ALEJANDRA (obstructive sleep apnea) 05/20/2019   • Dyslipidemia 09/10/2016   • Hypothyroidism, unspecified 09/10/2016   • Gastroesophageal reflux disease without esophagitis 09/10/2016       REVIEW OF SYSTEMS:  General: Patient denies any problems with nausea vomiting fever chills chest pain or tightness.  Head:  No history of strokes seizures severe head trauma or loss of consciousness.   HEENT: No history of any significant vision loss, hearing loss, changes in sense of smell hoarseness  Heart: No chest pain tightness squeezing.   Lungs: No recurrent asthma bronchitis pneumonia.   Gastrointestinal: No history of black or bloody stools or  Constipation colonoscopy was done.  Genitourinary:  No increased frequency urgency pain and burning with urination  Musculoskeletal: No joint pain or discomfort.   Skin: No skin changes   PHYSICAL EXAM   Constitutional: Alert, cooperative, not in acute distress.  Cardiovascular:  Rate Rhythm is  "regular without murmurs rubs clicks.     Thorax & Lungs: Clear to auscultation, no wheezing, rhonchi, or rales  HENT: Normocephalic, Atraumatic.  Eyes: PERRLA, EOMI, Conjunctiva normal.   Neck: Trachia is midline no swelling of the thyroid.   Lymphatic: No lymphadenopathy noted.   Abdomin: Soft non-tender, no rebound, no guarding.   Skin: Warm, Dry, No erythema, No rash.   Extremities: Atraumatic with symmetric distal pulses, No edema, No tenderness, No cyanosis, No clubbing.   Musculoskeletal: No significant abnormalities  Neurologic: Alert & oriented x 3, cranial nerves II through XII are intact, Normal motor function, Normal sensory function, No focal deficits noted.   Psychiatric: Affect normal, Judgment normal, Mood normal.     VITAL SIGNS:/80   Pulse 88   Temp 36.2 °C (97.2 °F) (Temporal)   Resp 18   Ht 1.765 m (5' 9.49\")   Wt 90.7 kg (200 lb)   SpO2 95%   BMI 29.12 kg/m²     Labs: Reviewed    Assessment:                                                     Plan:    1. Encounter to establish care with new doctor  Patient appears to be medically stable  - Comp Metabolic Panel; Future  - HEMOGLOBIN A1C; Future  - Lipid Profile; Future  - CBC WITH DIFFERENTIAL; Future  - REFERRAL TO OPHTHALMOLOGY  - FREE THYROXINE; Future  - TSH; Future  - CT-CARDIAC SCORING; Future  - VITAMIN D,25 HYDROXY; Future    2. Dyslipidemia  Patient with a history of dyslipidemia recheck lipid profile CT cardiac scoring ordered  - Comp Metabolic Panel; Future  - HEMOGLOBIN A1C; Future  - Lipid Profile; Future  - CBC WITH DIFFERENTIAL; Future  - REFERRAL TO OPHTHALMOLOGY  - FREE THYROXINE; Future  - TSH; Future  - CT-CARDIAC SCORING; Future  - VITAMIN D,25 HYDROXY; Future    3. Type 2 diabetes mellitus without complication, without long-term current use of insulin (HCC)  Type 2 diabetes recheck hemoglobin A1c  - Comp Metabolic Panel; Future  - HEMOGLOBIN A1C; Future  - Lipid Profile; Future  - CBC WITH DIFFERENTIAL; Future  - " REFERRAL TO OPHTHALMOLOGY  - FREE THYROXINE; Future  - TSH; Future  - CT-CARDIAC SCORING; Future  - VITAMIN D,25 HYDROXY; Future    4. Other specified hypothyroidism  Recheck free T4 TSH  - Comp Metabolic Panel; Future  - HEMOGLOBIN A1C; Future  - Lipid Profile; Future  - CBC WITH DIFFERENTIAL; Future  - REFERRAL TO OPHTHALMOLOGY  - FREE THYROXINE; Future  - TSH; Future  - CT-CARDIAC SCORING; Future  - VITAMIN D,25 HYDROXY; Future

## 2021-07-07 ENCOUNTER — HOSPITAL ENCOUNTER (OUTPATIENT)
Dept: RADIOLOGY | Facility: MEDICAL CENTER | Age: 71
End: 2021-07-07
Attending: INTERNAL MEDICINE

## 2021-07-07 DIAGNOSIS — E03.8 OTHER SPECIFIED HYPOTHYROIDISM: ICD-10-CM

## 2021-07-07 DIAGNOSIS — Z76.89 ENCOUNTER TO ESTABLISH CARE WITH NEW DOCTOR: ICD-10-CM

## 2021-07-07 DIAGNOSIS — E11.9 TYPE 2 DIABETES MELLITUS WITHOUT COMPLICATION, WITHOUT LONG-TERM CURRENT USE OF INSULIN (HCC): ICD-10-CM

## 2021-07-07 DIAGNOSIS — E78.5 DYSLIPIDEMIA: ICD-10-CM

## 2021-07-07 PROCEDURE — 4410556 CT-CARDIAC SCORING (SELF PAY ONLY)

## 2021-07-17 ENCOUNTER — HOSPITAL ENCOUNTER (OUTPATIENT)
Dept: LAB | Facility: MEDICAL CENTER | Age: 71
End: 2021-07-17
Attending: INTERNAL MEDICINE
Payer: MEDICARE

## 2021-07-17 DIAGNOSIS — E78.5 DYSLIPIDEMIA: ICD-10-CM

## 2021-07-17 DIAGNOSIS — Z76.89 ENCOUNTER TO ESTABLISH CARE WITH NEW DOCTOR: ICD-10-CM

## 2021-07-17 DIAGNOSIS — E11.9 TYPE 2 DIABETES MELLITUS WITHOUT COMPLICATION, WITHOUT LONG-TERM CURRENT USE OF INSULIN (HCC): ICD-10-CM

## 2021-07-17 DIAGNOSIS — E03.8 OTHER SPECIFIED HYPOTHYROIDISM: ICD-10-CM

## 2021-07-17 LAB
25(OH)D3 SERPL-MCNC: 18 NG/ML (ref 30–100)
ALBUMIN SERPL BCP-MCNC: 4.1 G/DL (ref 3.2–4.9)
ALBUMIN/GLOB SERPL: 1.3 G/DL
ALP SERPL-CCNC: 98 U/L (ref 30–99)
ALT SERPL-CCNC: 22 U/L (ref 2–50)
ANION GAP SERPL CALC-SCNC: 14 MMOL/L (ref 7–16)
AST SERPL-CCNC: 15 U/L (ref 12–45)
BASOPHILS # BLD AUTO: 1 % (ref 0–1.8)
BASOPHILS # BLD: 0.05 K/UL (ref 0–0.12)
BILIRUB SERPL-MCNC: 0.4 MG/DL (ref 0.1–1.5)
BUN SERPL-MCNC: 11 MG/DL (ref 8–22)
CALCIUM SERPL-MCNC: 9 MG/DL (ref 8.5–10.5)
CHLORIDE SERPL-SCNC: 97 MMOL/L (ref 96–112)
CHOLEST SERPL-MCNC: 234 MG/DL (ref 100–199)
CO2 SERPL-SCNC: 23 MMOL/L (ref 20–33)
CREAT SERPL-MCNC: 0.73 MG/DL (ref 0.5–1.4)
EOSINOPHIL # BLD AUTO: 0.04 K/UL (ref 0–0.51)
EOSINOPHIL NFR BLD: 0.8 % (ref 0–6.9)
ERYTHROCYTE [DISTWIDTH] IN BLOOD BY AUTOMATED COUNT: 44.5 FL (ref 35.9–50)
EST. AVERAGE GLUCOSE BLD GHB EST-MCNC: 369 MG/DL
GLOBULIN SER CALC-MCNC: 3.2 G/DL (ref 1.9–3.5)
GLUCOSE SERPL-MCNC: 309 MG/DL (ref 65–99)
HBA1C MFR BLD: 14.5 % (ref 4–5.6)
HCT VFR BLD AUTO: 44.1 % (ref 42–52)
HDLC SERPL-MCNC: 55 MG/DL
HGB BLD-MCNC: 14.7 G/DL (ref 14–18)
IMM GRANULOCYTES # BLD AUTO: 0.01 K/UL (ref 0–0.11)
IMM GRANULOCYTES NFR BLD AUTO: 0.2 % (ref 0–0.9)
LDLC SERPL CALC-MCNC: 143 MG/DL
LYMPHOCYTES # BLD AUTO: 1.74 K/UL (ref 1–4.8)
LYMPHOCYTES NFR BLD: 34.1 % (ref 22–41)
MCH RBC QN AUTO: 31.5 PG (ref 27–33)
MCHC RBC AUTO-ENTMCNC: 33.3 G/DL (ref 33.7–35.3)
MCV RBC AUTO: 94.6 FL (ref 81.4–97.8)
MONOCYTES # BLD AUTO: 0.53 K/UL (ref 0–0.85)
MONOCYTES NFR BLD AUTO: 10.4 % (ref 0–13.4)
NEUTROPHILS # BLD AUTO: 2.73 K/UL (ref 1.82–7.42)
NEUTROPHILS NFR BLD: 53.5 % (ref 44–72)
NRBC # BLD AUTO: 0 K/UL
NRBC BLD-RTO: 0 /100 WBC
PLATELET # BLD AUTO: 276 K/UL (ref 164–446)
PMV BLD AUTO: 11.9 FL (ref 9–12.9)
POTASSIUM SERPL-SCNC: 4.2 MMOL/L (ref 3.6–5.5)
PROT SERPL-MCNC: 7.3 G/DL (ref 6–8.2)
RBC # BLD AUTO: 4.66 M/UL (ref 4.7–6.1)
SODIUM SERPL-SCNC: 134 MMOL/L (ref 135–145)
T4 FREE SERPL-MCNC: 1.26 NG/DL (ref 0.93–1.7)
TRIGL SERPL-MCNC: 179 MG/DL (ref 0–149)
TSH SERPL DL<=0.005 MIU/L-ACNC: 1.51 UIU/ML (ref 0.38–5.33)
WBC # BLD AUTO: 5.1 K/UL (ref 4.8–10.8)

## 2021-07-17 PROCEDURE — 80061 LIPID PANEL: CPT

## 2021-07-17 PROCEDURE — 83036 HEMOGLOBIN GLYCOSYLATED A1C: CPT | Mod: GA

## 2021-07-17 PROCEDURE — 84443 ASSAY THYROID STIM HORMONE: CPT

## 2021-07-17 PROCEDURE — 36415 COLL VENOUS BLD VENIPUNCTURE: CPT

## 2021-07-17 PROCEDURE — 85025 COMPLETE CBC W/AUTO DIFF WBC: CPT

## 2021-07-17 PROCEDURE — 82306 VITAMIN D 25 HYDROXY: CPT | Mod: GA

## 2021-07-17 PROCEDURE — 80053 COMPREHEN METABOLIC PANEL: CPT

## 2021-07-17 PROCEDURE — 84439 ASSAY OF FREE THYROXINE: CPT

## 2021-07-19 ENCOUNTER — OFFICE VISIT (OUTPATIENT)
Dept: MEDICAL GROUP | Facility: LAB | Age: 71
End: 2021-07-19
Payer: MEDICARE

## 2021-07-19 VITALS
BODY MASS INDEX: 26.36 KG/M2 | HEART RATE: 78 BPM | TEMPERATURE: 97 F | WEIGHT: 178 LBS | HEIGHT: 69 IN | OXYGEN SATURATION: 94 % | RESPIRATION RATE: 12 BRPM | SYSTOLIC BLOOD PRESSURE: 130 MMHG | DIASTOLIC BLOOD PRESSURE: 70 MMHG

## 2021-07-19 DIAGNOSIS — E11.00 TYPE 2 DIABETES MELLITUS WITH HYPEROSMOLARITY WITHOUT COMA, WITHOUT LONG-TERM CURRENT USE OF INSULIN (HCC): ICD-10-CM

## 2021-07-19 PROCEDURE — 99213 OFFICE O/P EST LOW 20 MIN: CPT | Performed by: INTERNAL MEDICINE

## 2021-07-19 ASSESSMENT — FIBROSIS 4 INDEX: FIB4 SCORE: 0.79

## 2021-07-20 DIAGNOSIS — E11.00 TYPE 2 DIABETES MELLITUS WITH HYPEROSMOLARITY WITHOUT COMA, WITHOUT LONG-TERM CURRENT USE OF INSULIN (HCC): ICD-10-CM

## 2021-07-20 NOTE — TELEPHONE ENCOUNTER
Pharmacy is requesting 2 separate prescriptions for Freestyle robbie 2 reader and freestyle robbie 2 sensor.

## 2021-07-20 NOTE — PROGRESS NOTES
CC: Lopez Calvin is a 70 y.o. male is suffering from   Chief Complaint   Patient presents with   • Follow-Up     3 month          SUBJECTIVE:  1. Type 2 diabetes mellitus with hyperosmolarity without coma, without long-term current use of insulin (HCC)  Patient is here for follow-up has a history of type 2 diabetes, very poorly controlled. He has problems with a dental infection highly recommended we get his blood sugars down.        Past social, family, history:   Social History     Tobacco Use   • Smoking status: Light Tobacco Smoker     Packs/day: 0.25     Years: 30.00     Pack years: 7.50     Types: Cigars     Last attempt to quit: 2013     Years since quittin.5   • Smokeless tobacco: Never Used   • Tobacco comment: 3 cigars per week   Substance Use Topics   • Alcohol use: Yes     Alcohol/week: 4.8 oz     Types: 7 Glasses of wine, 1 Shots of liquor per week   • Drug use: Yes     Types: Marijuana     Comment: CBD oil         MEDICATIONS:    Current Outpatient Medications:   •  Insulin Degludec 100 UNIT/ML Solution Pen-injector, Inject 10-30 Units under the skin every day., Disp: 5 Each, Rfl: 0  •  Insulin Pen Needle 30G X 5 MM Misc, 1 Each every day., Disp: 90 Each, Rfl: 3  •  Misc. Devices Misc, Freestyle Libre2 system., Disp: 2 Each, Rfl: 11  •  levothyroxine (SYNTHROID) 100 MCG Tab, Take 1 Tab by mouth Every morning on an empty stomach., Disp: 90 Tab, Rfl: 3  •  Blood Glucose Meter Kit, Test blood sugar as recommended by provider. Accucheck Taty blood glucose monitoring kit., Disp: 1 Kit, Rfl: 0  •  Blood Glucose Test Strips, Use one Accucheck Taty strip to test blood sugar once daily ., Disp: 100 Strip, Rfl: 0  •  Lancets, Use one Accucheck Taty lancet to test blood sugar once daily ., Disp: 100 Each, Rfl: 0  •  Iron-Vitamins (GERITOL PO), Take 1 Tab by mouth every 48 hours., Disp: , Rfl:     PROBLEMS:  Patient Active Problem List    Diagnosis Date Noted   • Type 2 diabetes mellitus  "without complication, without long-term current use of insulin ([facility]) 05/20/2019   • ALEJANDRA (obstructive sleep apnea) 05/20/2019   • Dyslipidemia 09/10/2016   • Hypothyroidism, unspecified 09/10/2016   • Gastroesophageal reflux disease without esophagitis 09/10/2016       REVIEW OF SYSTEMS:  Gen.:  No Nausea, Vomiting, fever, Chills.  Heart: No chest pain.  Lungs:  No shortness of Breath.  Psychological: Robbie unusual Anxiety depression     PHYSICAL EXAM   Constitutional: Alert, cooperative, not in acute distress.  Cardiovascular:  Rate Rhythm is regular without murmurs rubs clicks.     Thorax & Lungs: Clear to auscultation, no wheezing, rhonchi, or rales  HENT: Normocephalic, Atraumatic.  Eyes: PERRLA, EOMI, Conjunctiva normal.   Neck: Trachia is midline no swelling of the thyroid.   Lymphatic: No lymphadenopathy noted.   Neurologic: Alert & oriented x 3, cranial nerves II through XII are intact, Normal motor function, Normal sensory function, No focal deficits noted.   Psychiatric: Affect normal, Judgment normal, Mood normal.     VITAL SIGNS:/70   Pulse 78   Temp 36.1 °C (97 °F)   Resp 12   Ht 1.753 m (5' 9\")   Wt 80.7 kg (178 lb)   SpO2 94%   BMI 26.29 kg/m²     Labs: Reviewed    Assessment:                                                     Plan:    1. Type 2 diabetes mellitus with hyperosmolarity without coma, without long-term current use of insulin ([facility])  Start insulin 10 units, patient is to start on low dose, send blood sugar records in 2 weeks, follow-up in 1 month  - Insulin Degludec 100 UNIT/ML Solution Pen-injector; Inject 10-30 Units under the skin every day.  Dispense: 5 Each; Refill: 0  - REFERRAL TO DIABETIC EDUCATION  - Insulin Pen Needle 30G X 5 MM Misc; 1 Each every day.  Dispense: 90 Each; Refill: 3  - Misc. Devices Misc; Freestyle Libre2 system.  Dispense: 2 Each; Refill: 11  - REFERRAL TO ENDOCRINOLOGY        "

## 2021-07-24 ENCOUNTER — TELEPHONE (OUTPATIENT)
Dept: MEDICAL GROUP | Facility: MEDICAL CENTER | Age: 71
End: 2021-07-24

## 2021-07-25 NOTE — TELEPHONE ENCOUNTER
On-call note.  Patient just received his glucometer today and his blood sugar nonfasting was 400 this afternoon after eating a pizza and having a glass of wine.  He was provided Tresiba by his provider to start at 10 units daily, he has not started that yet.  Called the patient and reviewed his labs, A1c over 14%.  Advised him he could start 15 units tonight and nightly, check his blood sugars before each meal starting tonight.  Also no sweets, no candies, no sodas, no fruit juices, for now no fruits, but vegetables are fine, proteins are fine, quinoa, couscous for carbohydrates, yams and sweet potatoes and limited portion sizes.  No alcohol.  Patient understands and agrees.  Will forward this note to his primary care provider.  He can call his primary care his blood sugars.

## 2021-08-03 ENCOUNTER — TELEPHONE (OUTPATIENT)
Dept: MEDICAL GROUP | Facility: LAB | Age: 71
End: 2021-08-03

## 2021-08-03 NOTE — TELEPHONE ENCOUNTER
1. Caller Name: Torres                        Call Back Number: [phone]      How would the patient prefer to be contacted with a response: Phone call OK to leave a detailed message    Pt's insulin was increased to 15 units.  His blood sugars are not coming down very fast.  He is asking if he should increase the insulin even more.  Please see the telephone encounter with Dr Obrien.

## 2021-08-04 ENCOUNTER — HOSPITAL ENCOUNTER (OUTPATIENT)
Dept: RADIOLOGY | Facility: MEDICAL CENTER | Age: 71
End: 2021-08-04
Attending: SURGERY
Payer: MEDICARE

## 2021-08-04 DIAGNOSIS — R13.14 DYSPHAGIA, PHARYNGOESOPHAGEAL PHASE: ICD-10-CM

## 2021-08-04 NOTE — TELEPHONE ENCOUNTER
Pt. Notified.  He wanted you to know that he's been doing the insulin in the evening instead of the morning like you said.  When he picked up the Rx it was evening and that is when he started it.  Is that ok?

## 2021-08-04 NOTE — TELEPHONE ENCOUNTER
Urmila:  Please call Torres, have him increase his Degludec to 20 units. Inform him I want a gradual increase in his blood sugars not a dramatic increase in his blood sugars because of the risk of hypoglycemia (2 low blood sugar)  Regards, Cedrick Perez, DO

## 2021-08-04 NOTE — PROGRESS NOTES
0837;gastric emptying study (solid);  patient unable to tolerate test; only able to eat half of the eggs; tried to take pictures; eggs only made it to esophagus and then patient started to vomit; patient stated oatmeal might be better but need gravity to get them down.-

## 2021-08-04 NOTE — TELEPHONE ENCOUNTER
Urmila:  Please call Torres okay to take medication in the evening.   Regards, Cedrick Perez, DO

## 2021-08-10 ENCOUNTER — TELEPHONE (OUTPATIENT)
Dept: ENDOCRINOLOGY | Facility: MEDICAL CENTER | Age: 71
End: 2021-08-10

## 2021-08-11 NOTE — TELEPHONE ENCOUNTER
VOICEMAIL  1. Caller Name: Lopez Calvin                        Call Back Number: [phone] (home)       2. Message: [facility]     3. Patient approves office to leave a detailed voicemail/MyChart message: yes      I called patient back and LVM I let him know that we received his msg and I was calling to schedule an appointment. I asked him to please call us back to schedule.

## 2021-08-13 ENCOUNTER — TELEPHONE (OUTPATIENT)
Dept: ENDOCRINOLOGY | Facility: MEDICAL CENTER | Age: 71
End: 2021-08-13

## 2021-08-13 NOTE — TELEPHONE ENCOUNTER
VOICEMAIL  1. Caller Name: Lopez Calvin                        Call Back Number: [phone]      2. Message: Patient called back and wanted to speak to Vilma to schedule NP shantanu.    3. Patient approves office to leave a detailed voicemail/MyChart message: yes    I called patient back and scheduled him an NP with Dr. Baltazar.

## 2021-08-17 DIAGNOSIS — E11.00 TYPE 2 DIABETES MELLITUS WITH HYPEROSMOLARITY WITHOUT COMA, WITHOUT LONG-TERM CURRENT USE OF INSULIN (HCC): ICD-10-CM

## 2021-08-19 ENCOUNTER — PATIENT MESSAGE (OUTPATIENT)
Dept: MEDICAL GROUP | Facility: LAB | Age: 71
End: 2021-08-19

## 2021-08-19 DIAGNOSIS — E11.00 TYPE 2 DIABETES MELLITUS WITH HYPEROSMOLARITY WITHOUT COMA, WITHOUT LONG-TERM CURRENT USE OF INSULIN (HCC): ICD-10-CM

## 2021-08-19 NOTE — TELEPHONE ENCOUNTER
"From: Lopez Calvin  To: Physician Cedrick Perez  Sent: 8/19/2021 10:25 AM PDT  Subject: My FreeStyle Bon 2 SENSOR Rx at [facility] on [address].       I need your help with my FreeStyle Bon 2 SENSOR Rx at [facility] on [address].      The price is too much. So I called Medicare -- the Medicare Rep & I called my [facility]. The Medicare Rep then said that when Dr Sidhu writes my Rx for the Sensors, he needs to choose reason: #3) \"In person visit with Doctor within last 6 months...\" or #4) \"Treatment requires frequent adustment based on blood glucose monitoring results.\" If Dr. Perez will note my Rx with either of those reasons for the Sensors, then my Medicare should pay for the Sensors.      Also, if possible, please write the Sensor Rx for as many Sensors as possible.      Thank you.      Sincerely,   Lopez \"Torres\"    Mobile: [phone]  "

## 2021-08-22 ENCOUNTER — PATIENT MESSAGE (OUTPATIENT)
Dept: MEDICAL GROUP | Facility: LAB | Age: 71
End: 2021-08-22

## 2021-08-27 RX ORDER — ASPIRIN 81 MG/1
81 TABLET ORAL
COMMUNITY
End: 2021-08-31

## 2021-08-27 RX ORDER — ONDANSETRON 4 MG/1
TABLET, ORALLY DISINTEGRATING ORAL
COMMUNITY
Start: 2021-06-11 | End: 2021-08-31

## 2021-08-27 RX ORDER — ATORVASTATIN CALCIUM 80 MG/1
80 TABLET, FILM COATED ORAL
COMMUNITY
End: 2021-08-31

## 2021-08-27 RX ORDER — ESZOPICLONE 3 MG/1
3 TABLET, FILM COATED ORAL
COMMUNITY
Start: 2021-04-22 | End: 2021-08-31

## 2021-08-27 RX ORDER — ESOMEPRAZOLE MAGNESIUM 10 MG/1
GRANULE, FOR SUSPENSION, EXTENDED RELEASE ORAL
COMMUNITY
End: 2021-08-31

## 2021-08-27 RX ORDER — AMOXICILLIN 500 MG/1
CAPSULE ORAL
COMMUNITY
Start: 2021-06-10 | End: 2021-08-31

## 2021-08-27 RX ORDER — CLINDAMYCIN HYDROCHLORIDE 300 MG/1
CAPSULE ORAL
COMMUNITY
Start: 2021-06-15 | End: 2021-08-31

## 2021-08-27 RX ORDER — LORAZEPAM 1 MG/1
TABLET ORAL
COMMUNITY
Start: 2021-07-09 | End: 2021-08-31

## 2021-08-27 RX ORDER — PEN NEEDLE, DIABETIC 32 GX 1/4"
NEEDLE, DISPOSABLE MISCELLANEOUS
COMMUNITY
Start: 2021-07-20 | End: 2022-01-04

## 2021-08-31 ENCOUNTER — OFFICE VISIT (OUTPATIENT)
Dept: ENDOCRINOLOGY | Facility: MEDICAL CENTER | Age: 71
End: 2021-08-31
Attending: INTERNAL MEDICINE
Payer: MEDICARE

## 2021-08-31 VITALS
WEIGHT: 185 LBS | RESPIRATION RATE: 13 BRPM | OXYGEN SATURATION: 96 % | HEIGHT: 69 IN | BODY MASS INDEX: 27.4 KG/M2 | DIASTOLIC BLOOD PRESSURE: 72 MMHG | HEART RATE: 99 BPM | SYSTOLIC BLOOD PRESSURE: 128 MMHG

## 2021-08-31 DIAGNOSIS — E11.65 TYPE 2 DIABETES MELLITUS WITH HYPERGLYCEMIA, WITH LONG-TERM CURRENT USE OF INSULIN (HCC): ICD-10-CM

## 2021-08-31 DIAGNOSIS — E03.8 OTHER SPECIFIED HYPOTHYROIDISM: ICD-10-CM

## 2021-08-31 DIAGNOSIS — E03.9 ACQUIRED HYPOTHYROIDISM: ICD-10-CM

## 2021-08-31 DIAGNOSIS — E55.9 VITAMIN D DEFICIENCY: ICD-10-CM

## 2021-08-31 DIAGNOSIS — E78.5 DYSLIPIDEMIA: ICD-10-CM

## 2021-08-31 DIAGNOSIS — Z79.4 TYPE 2 DIABETES MELLITUS WITH HYPERGLYCEMIA, WITH LONG-TERM CURRENT USE OF INSULIN (HCC): ICD-10-CM

## 2021-08-31 PROCEDURE — 99205 OFFICE O/P NEW HI 60 MIN: CPT | Performed by: INTERNAL MEDICINE

## 2021-08-31 PROCEDURE — 99212 OFFICE O/P EST SF 10 MIN: CPT | Performed by: INTERNAL MEDICINE

## 2021-08-31 RX ORDER — LEVOTHYROXINE SODIUM 0.1 MG/1
100 TABLET ORAL
Qty: 90 TABLET | Refills: 3 | Status: SHIPPED | OUTPATIENT
Start: 2021-08-31 | End: 2022-08-29

## 2021-08-31 RX ORDER — ATORVASTATIN CALCIUM 20 MG/1
20 TABLET, FILM COATED ORAL DAILY
Qty: 90 TABLET | Refills: 3 | Status: SHIPPED | OUTPATIENT
Start: 2021-08-31 | End: 2021-09-01 | Stop reason: SDUPTHER

## 2021-08-31 RX ORDER — INSULIN GLARGINE AND LIXISENATIDE 100; 33 U/ML; UG/ML
20 INJECTION, SOLUTION SUBCUTANEOUS DAILY
Qty: 15 ML | Refills: 11 | Status: SHIPPED | OUTPATIENT
Start: 2021-08-31 | End: 2022-01-04

## 2021-08-31 RX ORDER — ERGOCALCIFEROL 1.25 MG/1
50000 CAPSULE ORAL
Qty: 12 CAPSULE | Refills: 1 | Status: SHIPPED | OUTPATIENT
Start: 2021-08-31 | End: 2022-02-14

## 2021-08-31 ASSESSMENT — FIBROSIS 4 INDEX: FIB4 SCORE: 0.79

## 2021-09-01 ENCOUNTER — OFFICE VISIT (OUTPATIENT)
Dept: MEDICAL GROUP | Facility: LAB | Age: 71
End: 2021-09-01
Payer: MEDICARE

## 2021-09-01 VITALS
SYSTOLIC BLOOD PRESSURE: 120 MMHG | OXYGEN SATURATION: 97 % | DIASTOLIC BLOOD PRESSURE: 90 MMHG | WEIGHT: 186 LBS | HEIGHT: 70 IN | BODY MASS INDEX: 26.63 KG/M2 | HEART RATE: 72 BPM | RESPIRATION RATE: 14 BRPM | TEMPERATURE: 97.2 F

## 2021-09-01 DIAGNOSIS — E11.65 TYPE 2 DIABETES MELLITUS WITH HYPERGLYCEMIA, WITH LONG-TERM CURRENT USE OF INSULIN (HCC): ICD-10-CM

## 2021-09-01 DIAGNOSIS — Z79.4 TYPE 2 DIABETES MELLITUS WITH HYPERGLYCEMIA, WITH LONG-TERM CURRENT USE OF INSULIN (HCC): ICD-10-CM

## 2021-09-01 DIAGNOSIS — R93.89 ABNORMAL CT OF THE CHEST: ICD-10-CM

## 2021-09-01 DIAGNOSIS — E78.5 DYSLIPIDEMIA: ICD-10-CM

## 2021-09-01 PROCEDURE — 99214 OFFICE O/P EST MOD 30 MIN: CPT | Performed by: INTERNAL MEDICINE

## 2021-09-01 RX ORDER — ATORVASTATIN CALCIUM 20 MG/1
20 TABLET, FILM COATED ORAL DAILY
Qty: 90 TABLET | Refills: 3 | Status: SHIPPED | OUTPATIENT
Start: 2021-09-01 | End: 2022-01-04

## 2021-09-01 ASSESSMENT — FIBROSIS 4 INDEX: FIB4 SCORE: 0.79

## 2021-09-01 NOTE — PROGRESS NOTES
CC: Lopez Calvin is a 70 y.o. male is suffering from   Chief Complaint   Patient presents with   • Diabetes     6 weeks follow up         SUBJECTIVE:  1. Type 2 diabetes mellitus with hyperglycemia, with long-term current use of insulin (HCC)  Torres is here for follow-up, states that he is doing considerably better regarding his type 2 diabetes, has been evaluated by endocrinology will have follow-up    2. Abnormal CT of the chest  Patient with an abnormal CT of his chest on CT cardiac scoring with pulmonary nodule less than 6 cm, have recommended repeat chest CT in 1 year history of smoking    3. Dyslipidemia  History of dyslipidemia, continue with atorvastatin        Past social, family, history: ,   Social History     Tobacco Use   • Smoking status: Light Tobacco Smoker     Packs/day: 0.25     Years: 30.00     Pack years: 7.50     Types: Cigars     Last attempt to quit: 2013     Years since quittin.6   • Smokeless tobacco: Never Used   • Tobacco comment: 3 cigars per week   Substance Use Topics   • Alcohol use: Yes     Alcohol/week: 4.8 oz     Types: 7 Glasses of wine, 1 Shots of liquor per week   • Drug use: Yes     Types: Marijuana     Comment: CBD oil         MEDICATIONS:    Current Outpatient Medications:   •  atorvastatin (LIPITOR) 20 MG Tab, Take 1 Tablet by mouth every day., Disp: 90 Tablet, Rfl: 3  •  Docusate Sodium (COLACE PO), Take  by mouth., Disp: , Rfl:   •  Insulin Glargine-Lixisenatide (SOLIQUA) 100-33 UNT-MCG/ML Solution Pen-injector, Inject 20 Units under the skin every day., Disp: 15 mL, Rfl: 11  •  levothyroxine (SYNTHROID) 100 MCG Tab, Take 1 Tablet by mouth every morning on an empty stomach., Disp: 90 Tablet, Rfl: 3  •  Continuous Blood Gluc Sensor (FREESTYLE JHON 2 SENSOR) Misc, 1 Each every 14 days., Disp: 2 Each, Rfl: 11  •  vitamin D, Ergocalciferol, (DRISDOL) 1.25 MG (58631 UT) Cap capsule, Take 1 Capsule by mouth every 7 days., Disp: 12 Capsule, Rfl:  "1  •  Continuous Blood Gluc  (FREESTYLE JHON 2 READER) Device, USE AS DIRECTED, Disp: , Rfl:   •  BD PEN NEEDLE MICRO U/F 32G X 6 MM Misc, USE ONE PEN NEEDLE ONCE DAILY, Disp: , Rfl:   •  Insulin Pen Needle 30G X 5 MM Misc, 1 Each every day., Disp: 90 Each, Rfl: 3    PROBLEMS:  Patient Active Problem List    Diagnosis Date Noted   • Vitamin D deficiency 08/31/2021   • Type 2 diabetes mellitus with hyperglycemia, with long-term current use of insulin ([facility]) 05/20/2019   • ALEJANDRA (obstructive sleep apnea) 05/20/2019   • Dyslipidemia 09/10/2016   • Acquired hypothyroidism 09/10/2016   • Gastroesophageal reflux disease without esophagitis 09/10/2016       REVIEW OF SYSTEMS:  Gen.:  No Nausea, Vomiting, fever, Chills.  Heart: No chest pain.  Lungs:  No shortness of Breath.  Psychological: Robbie unusual Anxiety depression     PHYSICAL EXAM   Constitutional: Alert, cooperative, not in acute distress.  Cardiovascular:  Rate Rhythm is regular without murmurs rubs clicks.     Thorax & Lungs: Clear to auscultation, no wheezing, rhonchi, or rales  HENT: Normocephalic, Atraumatic.  Eyes: PERRLA, EOMI, Conjunctiva normal.   Neck: Trachia is midline no swelling of the thyroid.   Lymphatic: No lymphadenopathy noted.   Neurologic: Alert & oriented x 3, cranial nerves II through XII are intact, Normal motor function, Normal sensory function, No focal deficits noted.   Psychiatric: Affect normal, Judgment normal, Mood normal.     VITAL SIGNS:/90   Pulse 72   Temp 36.2 °C (97.2 °F) (Temporal)   Resp 14   Ht 1.778 m (5' 10\")   Wt 84.4 kg (186 lb)   SpO2 97%   BMI 26.69 kg/m²     Labs: Reviewed    Assessment:                                                     Plan:    1. Type 2 diabetes mellitus with hyperglycemia, with long-term current use of insulin ([facility])  Type 2 diabetes clinically stable and improved, status post evaluation by endocrinology, continue current therapy    2. Abnormal CT of the chest  Chest CT with " pulmonary nodule, recheck in 1 year  - CT-CHEST (THORAX) W/O; Future    3. Dyslipidemia  Continue atorvastatin  - atorvastatin (LIPITOR) 20 MG Tab; Take 1 Tablet by mouth every day.  Dispense: 90 Tablet; Refill: 3

## 2021-09-01 NOTE — PROGRESS NOTES
"Chief Complaint:  Consult requested by Cedrick Perez D.O. for initial evaluation of Type 2 Diabetes Mellitus    HPI:   Lopez Calvin is a 70 y.o. male with Type 2 Diabetes Mellitus diagnosed in many years ago.  He denies hospitalizations for DKA in the past.    His A1c was 14% on July 17, 2021    He reports that he had a hard time after he underwent surgical repair for severe hiatal hernia.  He had surgery on November 16, 2020 at [facility] by Dr. Champ Sandoval. ([facility])    He has a history of trying and failing Metformin and glipizide due to GI intolerance.    He is currently on Tresiba 18 units daily and Humalog sliding scale 3 times a day    He has been injecting insulin 4 times a day for the past 6 months  He has been testing sugars 4 times a day for the past 6 months    He has a [employer] CGM generation 2 and is having a hard time getting this filled with his local [facility]  And I explained to him that we can get it approved if we send it to a specialty pharmacy like [facility].      Download of his CGM shows an average sugar of 126 with time in range of 90% it appears that his glucose control is better since he started taking Tresiba and Humalog sliding scale    We do not have an updated C-peptide on hand    His other comorbid issues include PTSD, hyperlipidemia and hypertension  He stopped taking his atorvastatin    He also has primary hypothyroidism and is on levothyroxine 100 mcg daily which has been his medication for many years.  Last TSH was normal at 1.5 on July 2021      He denies hypoglycemic episodes  He  denies hypoglycemic unawareness. He denies episodes of severe hypoglycemia requiring third party assistance.  He  is not wearing a medical alert bracelet or necklace.  He does not a glucagon emergency kit.    He reports attending diabetes education classes.  Diet: \"healthy\" diet  in general.    Diabetes Complications   He  Is unsure history of " retinopathy.  He denies laser eye surgery. Last eye exam: He doesn't recall the details  He denies history of peripheral sensory neuropathy.  He denies numbness, tingling in both feet.  He denies history of foot sores.   He denies history of kidney damage.  He is not on ACE inhibitor or ARB.   He denies history of coronary artery disease.  He  denies history of stroke and denies TIA.  He denies history of PAD.  He reports history of hyperlipidemia.      ROS:     CONS:     No fever, no chills, no weight loss, no fatigue   EYES:      No diplopia, no blurry vision, no redness of eyes, no swelling of eyelids   ENT:    No hearing loss, No ear pain, No sore throat, no dysphagia, no neck swelling   CV:     No chest pain, no palpitations, no claudication, no orthopnea, no PND   PULM:    No SOB, no cough, no hemoptysis, no wheezing    GI:   No nausea, no vomiting, no diarrhea, no constipation, no bloody stools   :  Passing urine well, no dysuria, no hematuria   ENDO:   No polyuria, no polydipsia, no heat intolerance, no cold intolerance   NEURO: No headaches, no dizziness, no convulsions, no tremors   MUSC:  No joint swellings, no arthralgias, no myalgias, reports generalized weakness   SKIN:   No rash, no ulcers, no dry skin, reports sweating   PSYCH:   No depression, reports anxiety, no difficulty sleeping       Past Medical History:  Patient Active Problem List    Diagnosis Date Noted   • Type 2 diabetes mellitus without complication, without long-term current use of insulin ([facility]) 05/20/2019   • ALEJANDRA (obstructive sleep apnea) 05/20/2019   • Dyslipidemia 09/10/2016   • Hypothyroidism, unspecified 09/10/2016   • Gastroesophageal reflux disease without esophagitis 09/10/2016       Past Surgical History:  Past Surgical History:   Procedure Laterality Date   • INGUINAL HERNIA REPAIR ROBOTIC Bilateral 3/3/2017    Procedure: Robot Assisted/Laparoscopic Repair Bilateral Inguinal Hernias with Mesh;  Surgeon: Champ Garber M.D.;   Location: SURGERY [facility];  Service:    • SINUSCOPY Bilateral    • CATARACT EXTRACTION WITH IOL Bilateral    • TONSILLECTOMY AND ADENOIDECTOMY          Allergies:  Azithromycin, Cefpodoxime, Levofloxacin, Oseltamivir, and Tamiflu     Current Medications:    Current Outpatient Medications:   •  Docusate Sodium (COLACE PO), Take  by mouth., Disp: , Rfl:   •  Continuous Blood Gluc  (FREESTYLE JHON 2 READER) Device, USE AS DIRECTED, Disp: , Rfl:   •  Continuous Blood Gluc Sensor (FREESTYLE JHON 2 SENSOR) Misc, USE AS DIRECTED, Disp: , Rfl:   •  BD PEN NEEDLE MICRO U/F 32G X 6 MM Misc, USE ONE PEN NEEDLE ONCE DAILY, Disp: , Rfl:   •  Insulin Degludec 100 UNIT/ML Solution Pen-injector, Inject 10-30 Units under the skin every day., Disp: 5 Each, Rfl: 0  •  Insulin Pen Needle 30G X 5 MM Misc, 1 Each every day., Disp: 90 Each, Rfl: 3  •  levothyroxine (SYNTHROID) 100 MCG Tab, Take 1 Tab by mouth Every morning on an empty stomach., Disp: 90 Tab, Rfl: 3  •  Misc. Devices Misc, Freestyle Libre2 sensor, Disp: 2 Each, Rfl: 11  •  Blood Glucose Meter Kit, Test blood sugar as recommended by provider.Free style jhon 2 reader., Disp: 1 Kit, Rfl: 0  •  Blood Glucose Test Strips, Use one Accucheck Taty strip to test blood sugar once daily ., Disp: 100 Strip, Rfl: 0  •  Lancets, Use one Accucheck Taty lancet to test blood sugar once daily ., Disp: 100 Each, Rfl: 0    Social History:  Social History     Socioeconomic History   • Marital status:      Spouse name: Not on file   • Number of children: Not on file   • Years of education: Not on file   • Highest education level: Not on file   Occupational History   • Not on file   Tobacco Use   • Smoking status: Light Tobacco Smoker     Packs/day: 0.25     Years: 30.00     Pack years: 7.50     Types: Cigars     Last attempt to quit: 2013     Years since quittin.6   • Smokeless tobacco: Never Used   • Tobacco comment: 3 cigars per week   Substance and  "Sexual Activity   • Alcohol use: Yes     Alcohol/week: 4.8 oz     Types: 7 Glasses of wine, 1 Shots of liquor per week   • Drug use: Yes     Types: Marijuana     Comment: CBD oil   • Sexual activity: Yes     Partners: Female     Birth control/protection: Coitus Interruptus     Comment: spouse   Other Topics Concern   • Not on file   Social History Narrative   • Not on file     Social Determinants of Health     Financial Resource Strain:    • Difficulty of Paying Living Expenses:    Food Insecurity:    • Worried About Running Out of Food in the Last Year:    • Ran Out of Food in the Last Year:    Transportation Needs:    • Lack of Transportation (Medical):    • Lack of Transportation (Non-Medical):    Physical Activity:    • Days of Exercise per Week:    • Minutes of Exercise per Session:    Stress:    • Feeling of Stress :    Social Connections:    • Frequency of Communication with Friends and Family:    • Frequency of Social Gatherings with Friends and Family:    • Attends Zoroastrianism Services:    • Active Member of Clubs or Organizations:    • Attends Club or Organization Meetings:    • Marital Status:    Intimate Partner Violence:    • Fear of Current or Ex-Partner:    • Emotionally Abused:    • Physically Abused:    • Sexually Abused:         Family History:   Family History   Problem Relation Age of Onset   • Heart Disease Mother    • Stroke Mother    • Other Father         tb   • Cancer Father         skin cancer   • No Known Problems Sister    • No Known Problems Brother    • Heart Disease Maternal Grandmother    • Stroke Maternal Grandmother    • No Known Problems Maternal Grandfather    • Other Paternal Grandfather         tb   • No Known Problems Brother        PHYSICAL EXAM:   Vital signs: /72 (BP Location: Left arm, Patient Position: Sitting, BP Cuff Size: Adult)   Pulse 99   Resp 13   Ht 1.753 m (5' 9\")   Wt 83.9 kg (185 lb)   SpO2 96%   BMI 27.32 kg/m²   GENERAL: Well-developed, " well-nourished  in no apparent distress.   EYE: No ocular and eyelid asymmetry, Anicteric sclerae,  PERRL, No exophthalmos or lidlag  HENT: Hearing grossly intact, Normocephalic, atraumatic. Pink, moist mucous membranes, No exudate  NECK: Supple. Trachea midline. thyroid is normal in size without nodules or tenderness  CARDIOVASCULAR: Regular rate and rhythm. No murmurs, rubs, or gallops.   LUNGS: Clear to auscultation bilaterally   ABDOMEN: Soft, nontender with positive bowel sounds.   EXTREMITIES: No clubbing, cyanosis, or edema.   NEUROLOGICAL: Cranial nerves II-XII are grossly intact   Symmetric reflexes at the patella no proximal muscle weakness, No visible tremor with both outstretched hands  LYMPH: No cervical, supraclavicular,  adenopathy palpated.   SKIN: No rashes, lesions. Turgor is normal.  FOOT: Normal sensation to monofilament testing, normal pulses, no ulcers.  Normal Vibration quantitative sensation test.    Labs:  Lab Results   Component Value Date/Time    HBA1C 14.5 (H) 07/17/2021 1031    AVGLUC 369 07/17/2021 1031       Lab Results   Component Value Date/Time    WBC 5.1 07/17/2021 10:31 AM    RBC 4.66 (L) 07/17/2021 10:31 AM    HEMOGLOBIN 14.7 07/17/2021 10:31 AM    MCV 94.6 07/17/2021 10:31 AM    MCH 31.5 07/17/2021 10:31 AM    MCHC 33.3 (L) 07/17/2021 10:31 AM    RDW 44.5 07/17/2021 10:31 AM    MPV 11.9 07/17/2021 10:31 AM       Lab Results   Component Value Date/Time    SODIUM 134 (L) 07/17/2021 10:31 AM    POTASSIUM 4.2 07/17/2021 10:31 AM    CHLORIDE 97 07/17/2021 10:31 AM    CO2 23 07/17/2021 10:31 AM    ANION 14.0 07/17/2021 10:31 AM    GLUCOSE 309 (H) 07/17/2021 10:31 AM    BUN 11 07/17/2021 10:31 AM    CREATININE 0.73 07/17/2021 10:31 AM    CALCIUM 9.0 07/17/2021 10:31 AM    ASTSGOT 15 07/17/2021 10:31 AM    ALTSGPT 22 07/17/2021 10:31 AM    TBILIRUBIN 0.4 07/17/2021 10:31 AM    ALBUMIN 4.1 07/17/2021 10:31 AM    TOTPROTEIN 7.3 07/17/2021 10:31 AM    GLOBULIN 3.2 07/17/2021 10:31 AM     NILSAATIO 1.3 07/17/2021 10:31 AM       Lab Results   Component Value Date/Time    CHOLSTRLTOT 234 (H) 07/17/2021 1031    TRIGLYCERIDE 179 (H) 07/17/2021 1031    HDL 55 07/17/2021 1031     (H) 07/17/2021 1031       No results found for: MICROALBCALC, MALBCRT, MALBEXCR, CIRURU85, MICROALBUR, MICRALB, UMICROALBUM, MICROALBTIM     Lab Results   Component Value Date/Time    TSHULTRASEN 1.510 07/17/2021 1031     No results found for: FREEDIR  No results found for: FREET3  No results found for: THYSTIMIG      ASSESSMENT/PLAN:     1. Type 2 diabetes mellitus with hyperglycemia, with long-term current use of insulin (HCC)  Previously uncontrolled  Download of CGM today shows improved blood sugar readings  I am going to replace Tresiba with Soliqua 20 units daily  I explained to him the differences between Tresiba and Soliqua  I recommend that he should notify me if he has problems with his medication  I am going to send his robbie CGM sensor prescription to [facility] to get it covered by Medicare  I recommend that he follow the plate method of eating  I recommend that he check his sugars regularly  I recommend that he get an updated eye exam  I want to repeat his serum creatinine C-peptide, urine microalbumin fasting lipids and other labs in 3 months  I want him to follow-up in 3 months    2. Acquired hypothyroidism  Controlled   continue levothyroxine 100 mcg daily  Repeat TSH levels in 6 months    3. Dyslipidemia  Unstable  LDL cholesterol is not at goal at 143  Recommend that he restart atorvastatin 20 mg daily  Repeat fasting lipids in 3 months    4. Vitamin D deficiency  Uncontrolled  Vitamin D is low at 18  Recommend he start ergocalciferol 50,000 units weekly  Repeat calcium vitamin D levels in 3 months      Return in about 3 months (around 11/30/2021).       This patient during there office visit was started on new medication.  Side effects of new medications were discussed with the patient today  in the office. The patient was supplied paperwork on this new medication.    Total time spent on date of service was over 60 minutes which included reviewing his labs download his CGM explaining the treatment of type 2 diabetes and explaining how to utilize his new medication.  Sending orders and prescriptions ordering labs and scheduling follow-up and coordinating care    Thank you kindly for allowing me to participate in the diabetes care plan for this patient.    Kane Baltazar MD, [facility], [facility]  08/31/21    CC:   Cedrick Perez D.O.

## 2021-09-14 DIAGNOSIS — Z79.4 TYPE 2 DIABETES MELLITUS WITH HYPERGLYCEMIA, WITH LONG-TERM CURRENT USE OF INSULIN (HCC): ICD-10-CM

## 2021-09-14 DIAGNOSIS — E11.65 TYPE 2 DIABETES MELLITUS WITH HYPERGLYCEMIA, WITH LONG-TERM CURRENT USE OF INSULIN (HCC): ICD-10-CM

## 2021-09-14 RX ORDER — PROCHLORPERAZINE 25 MG/1
1 SUPPOSITORY RECTAL
Qty: 2 EACH | Refills: 1 | Status: SHIPPED | OUTPATIENT
Start: 2021-09-14 | End: 2021-09-29 | Stop reason: SDUPTHER

## 2021-09-14 RX ORDER — PROCHLORPERAZINE 25 MG/1
1 SUPPOSITORY RECTAL CONTINUOUS
Qty: 1 EACH | Refills: 0 | Status: SHIPPED | OUTPATIENT
Start: 2021-09-14 | End: 2021-09-29 | Stop reason: SDUPTHER

## 2021-09-14 RX ORDER — PROCHLORPERAZINE 25 MG/1
1 SUPPOSITORY RECTAL
Qty: 9 EACH | Refills: 3 | Status: SHIPPED | OUTPATIENT
Start: 2021-09-14 | End: 2021-09-29 | Stop reason: SDUPTHER

## 2021-09-14 NOTE — PROGRESS NOTES
Insurance will not cover the Freestyle Bon.  Prescription for the Dexcom G6 sent to [facility] pharmacy on [address] per patient request.

## 2021-09-29 DIAGNOSIS — Z79.4 TYPE 2 DIABETES MELLITUS WITH HYPERGLYCEMIA, WITH LONG-TERM CURRENT USE OF INSULIN (HCC): ICD-10-CM

## 2021-09-29 DIAGNOSIS — E11.65 TYPE 2 DIABETES MELLITUS WITH HYPERGLYCEMIA, WITH LONG-TERM CURRENT USE OF INSULIN (HCC): ICD-10-CM

## 2021-09-29 RX ORDER — PROCHLORPERAZINE 25 MG/1
1 SUPPOSITORY RECTAL
Qty: 9 EACH | Refills: 3 | Status: SHIPPED | OUTPATIENT
Start: 2021-09-29 | End: 2022-01-04

## 2021-09-29 RX ORDER — PROCHLORPERAZINE 25 MG/1
1 SUPPOSITORY RECTAL CONTINUOUS
Qty: 1 EACH | Refills: 0 | Status: SHIPPED | OUTPATIENT
Start: 2021-09-29 | End: 2022-01-04

## 2021-09-29 RX ORDER — PROCHLORPERAZINE 25 MG/1
1 SUPPOSITORY RECTAL
Qty: 2 EACH | Refills: 1 | Status: SHIPPED | OUTPATIENT
Start: 2021-09-29 | End: 2022-01-04 | Stop reason: SDUPTHER

## 2021-09-30 NOTE — PROGRESS NOTES
dexcom was ordered on 8/31/2021 but pharmacy claims it was not received despite well documented electronic e script    Will resend again

## 2021-10-25 ENCOUNTER — HOSPITAL ENCOUNTER (OUTPATIENT)
Dept: RADIOLOGY | Facility: MEDICAL CENTER | Age: 71
End: 2021-10-25
Attending: SURGERY
Payer: MEDICARE

## 2021-10-25 PROCEDURE — A9541 TC99M SULFUR COLLOID: HCPCS

## 2021-11-11 ENCOUNTER — TELEPHONE (OUTPATIENT)
Dept: MEDICAL GROUP | Facility: LAB | Age: 71
End: 2021-11-11

## 2021-11-11 DIAGNOSIS — K30 DELAYED GASTRIC EMPTYING: ICD-10-CM

## 2021-11-11 NOTE — TELEPHONE ENCOUNTER
----- Message from Lopez Calvin sent at 11/11/2021 11:36 AM PST -----  Regarding: Referral  According to my recent Gastric Emptying Test, it takes over 40 Days for my Stomach to empty! #Please refer me to an Acupuncturists, preferably associated with [facility] or another medical group, for an appointment to help stimulate my stomach to empty. #Also,  refer me to a Massage Therapist, preferably associated with [facility] or another medical group. Thank you, Torres Calvin~E-Mail: [email or web address]~Cell phone [phone]

## 2021-11-12 NOTE — TELEPHONE ENCOUNTER
Sugey:  Please notify Torres that I have signed a referral for acupuncture  Regards, Cedrick Perez, DO

## 2021-11-22 ENCOUNTER — APPOINTMENT (OUTPATIENT)
Dept: MEDICAL GROUP | Facility: IMAGING CENTER | Age: 71
End: 2021-11-22

## 2021-11-29 ENCOUNTER — APPOINTMENT (OUTPATIENT)
Dept: MEDICAL GROUP | Facility: IMAGING CENTER | Age: 71
End: 2021-11-29

## 2021-12-03 ENCOUNTER — APPOINTMENT (OUTPATIENT)
Dept: MEDICAL GROUP | Facility: IMAGING CENTER | Age: 71
End: 2021-12-03

## 2021-12-07 ENCOUNTER — NON-PROVIDER VISIT (OUTPATIENT)
Dept: ENDOCRINOLOGY | Facility: MEDICAL CENTER | Age: 71
End: 2021-12-07
Attending: INTERNAL MEDICINE
Payer: MEDICARE

## 2021-12-07 VITALS — HEIGHT: 69 IN | WEIGHT: 199 LBS | OXYGEN SATURATION: 95 % | BODY MASS INDEX: 29.47 KG/M2

## 2021-12-07 DIAGNOSIS — Z79.4 TYPE 2 DIABETES MELLITUS WITH HYPERGLYCEMIA, WITH LONG-TERM CURRENT USE OF INSULIN (HCC): ICD-10-CM

## 2021-12-07 DIAGNOSIS — E11.65 TYPE 2 DIABETES MELLITUS WITH HYPERGLYCEMIA, WITH LONG-TERM CURRENT USE OF INSULIN (HCC): ICD-10-CM

## 2021-12-07 LAB
HBA1C MFR BLD: 6.6 % (ref 0–5.6)
INT CON NEG: ABNORMAL
INT CON POS: ABNORMAL

## 2021-12-07 PROCEDURE — 99213 OFFICE O/P EST LOW 20 MIN: CPT | Performed by: INTERNAL MEDICINE

## 2021-12-07 PROCEDURE — 83036 HEMOGLOBIN GLYCOSYLATED A1C: CPT

## 2021-12-07 ASSESSMENT — FIBROSIS 4 INDEX: FIB4 SCORE: 0.79

## 2021-12-07 NOTE — PROGRESS NOTES
RN-CDE Note    Subjective:   Endocrinology Clinic Progress Note  PCP: Cedrick Perez D.O.    HPI:  Lopez Calvin is a 70 y.o. old patient who is seen today for review of Type 2 Diabetes and Hypothyroidism.  Recent changes in health: He had problems with COVID, stomach surgery and an infected tooth that he feels made his blood sugars high.  He was recently diagnosed with severe gastric emptying.  He has stopped his soliqua for the last week.  He is having acupuncture.  DM:   Last A1c:   Lab Results   Component Value Date/Time    HBA1C 6.6 (A) 12/07/2021 09:27 AM      Previous A1c was 14.5 on 7/17/21  A1C GOAL: < 7    Diabetes Medications:   Soliqua he has stopped      Exercise: Walking daily for 30 minutes  Diet: Eating a lot less  Patient's body mass index is 29.39 kg/m². Exercise and nutrition counseling were performed at this visit.    Glucose monitoring frequency: See Dexcom download    Hypoglycemic episodes: no  Last Retinal Exam: on file and up-to-date  Daily Foot Exam: Yes   No results found for: MICROALBCALC, MALBCRT, MALBEXCR, PSBAUQ88, MICROALBUR, MICRALB, UMICROALBUM, MICROALBTIM  He  reports that he has been smoking cigars. He has a 7.50 pack-year smoking history. He has never used smokeless tobacco.      Plan:     Discussed and educated on:   - All medications, side effects and compliance (discussed carefully)  - Annual eye examinations at Ophthalmology  - Home glucose monitoring emphasized  - Weight control and daily exercise    Recommended medication changes: He would like to stay off the Soliqua and only use it if his blood sugars start to go up.  He thinks the Dexcom is what is helping him stay in control with his stomach issues.  He has stopped his Lipitor but will recheck his lab before his next appointment.

## 2021-12-09 ENCOUNTER — APPOINTMENT (OUTPATIENT)
Dept: MEDICAL GROUP | Facility: IMAGING CENTER | Age: 71
End: 2021-12-09

## 2021-12-10 ENCOUNTER — APPOINTMENT (OUTPATIENT)
Dept: MEDICAL GROUP | Facility: IMAGING CENTER | Age: 71
End: 2021-12-10

## 2021-12-13 ENCOUNTER — APPOINTMENT (OUTPATIENT)
Dept: MEDICAL GROUP | Facility: IMAGING CENTER | Age: 71
End: 2021-12-13

## 2021-12-16 ENCOUNTER — APPOINTMENT (OUTPATIENT)
Dept: MEDICAL GROUP | Facility: IMAGING CENTER | Age: 71
End: 2021-12-16

## 2021-12-21 ENCOUNTER — APPOINTMENT (OUTPATIENT)
Dept: MEDICAL GROUP | Facility: IMAGING CENTER | Age: 71
End: 2021-12-21

## 2021-12-23 ENCOUNTER — APPOINTMENT (OUTPATIENT)
Dept: MEDICAL GROUP | Facility: IMAGING CENTER | Age: 71
End: 2021-12-23

## 2021-12-31 ENCOUNTER — HOSPITAL ENCOUNTER (OUTPATIENT)
Dept: LAB | Facility: MEDICAL CENTER | Age: 71
End: 2021-12-31
Attending: INTERNAL MEDICINE
Payer: MEDICARE

## 2021-12-31 ENCOUNTER — HOSPITAL ENCOUNTER (OUTPATIENT)
Dept: LAB | Facility: MEDICAL CENTER | Age: 71
End: 2021-12-31
Payer: MEDICARE

## 2021-12-31 DIAGNOSIS — E11.65 TYPE 2 DIABETES MELLITUS WITH HYPERGLYCEMIA, WITH LONG-TERM CURRENT USE OF INSULIN (HCC): ICD-10-CM

## 2021-12-31 DIAGNOSIS — E03.9 ACQUIRED HYPOTHYROIDISM: ICD-10-CM

## 2021-12-31 DIAGNOSIS — E55.9 VITAMIN D DEFICIENCY: ICD-10-CM

## 2021-12-31 DIAGNOSIS — E78.5 DYSLIPIDEMIA: ICD-10-CM

## 2021-12-31 DIAGNOSIS — Z79.4 TYPE 2 DIABETES MELLITUS WITH HYPERGLYCEMIA, WITH LONG-TERM CURRENT USE OF INSULIN (HCC): ICD-10-CM

## 2021-12-31 DIAGNOSIS — E03.8 OTHER SPECIFIED HYPOTHYROIDISM: ICD-10-CM

## 2021-12-31 DIAGNOSIS — E11.9 TYPE 2 DIABETES MELLITUS WITHOUT COMPLICATION, WITHOUT LONG-TERM CURRENT USE OF INSULIN (HCC): ICD-10-CM

## 2021-12-31 LAB
25(OH)D3 SERPL-MCNC: 36 NG/ML (ref 30–100)
ALBUMIN SERPL BCP-MCNC: 4.3 G/DL (ref 3.2–4.9)
ALBUMIN SERPL BCP-MCNC: 4.4 G/DL (ref 3.2–4.9)
ALBUMIN/GLOB SERPL: 1.4 G/DL
ALBUMIN/GLOB SERPL: 1.5 G/DL
ALP SERPL-CCNC: 81 U/L (ref 30–99)
ALP SERPL-CCNC: 82 U/L (ref 30–99)
ALT SERPL-CCNC: 29 U/L (ref 2–50)
ALT SERPL-CCNC: 30 U/L (ref 2–50)
ANION GAP SERPL CALC-SCNC: 12 MMOL/L (ref 7–16)
ANION GAP SERPL CALC-SCNC: 13 MMOL/L (ref 7–16)
AST SERPL-CCNC: 21 U/L (ref 12–45)
AST SERPL-CCNC: 22 U/L (ref 12–45)
BASOPHILS # BLD AUTO: 1.1 % (ref 0–1.8)
BASOPHILS # BLD: 0.06 K/UL (ref 0–0.12)
BILIRUB SERPL-MCNC: 0.5 MG/DL (ref 0.1–1.5)
BILIRUB SERPL-MCNC: 0.5 MG/DL (ref 0.1–1.5)
BUN SERPL-MCNC: 18 MG/DL (ref 8–22)
BUN SERPL-MCNC: 18 MG/DL (ref 8–22)
CALCIUM SERPL-MCNC: 9.5 MG/DL (ref 8.5–10.5)
CALCIUM SERPL-MCNC: 9.6 MG/DL (ref 8.5–10.5)
CHLORIDE SERPL-SCNC: 99 MMOL/L (ref 96–112)
CHLORIDE SERPL-SCNC: 99 MMOL/L (ref 96–112)
CHOLEST SERPL-MCNC: 230 MG/DL (ref 100–199)
CO2 SERPL-SCNC: 22 MMOL/L (ref 20–33)
CO2 SERPL-SCNC: 23 MMOL/L (ref 20–33)
CREAT SERPL-MCNC: 0.81 MG/DL (ref 0.5–1.4)
CREAT SERPL-MCNC: 0.81 MG/DL (ref 0.5–1.4)
CREAT UR-MCNC: 114.94 MG/DL
EOSINOPHIL # BLD AUTO: 0.05 K/UL (ref 0–0.51)
EOSINOPHIL NFR BLD: 0.9 % (ref 0–6.9)
ERYTHROCYTE [DISTWIDTH] IN BLOOD BY AUTOMATED COUNT: 47.4 FL (ref 35.9–50)
EST. AVERAGE GLUCOSE BLD GHB EST-MCNC: 154 MG/DL
FASTING STATUS PATIENT QL REPORTED: NORMAL
GLOBULIN SER CALC-MCNC: 3 G/DL (ref 1.9–3.5)
GLOBULIN SER CALC-MCNC: 3 G/DL (ref 1.9–3.5)
GLUCOSE SERPL-MCNC: 127 MG/DL (ref 65–99)
GLUCOSE SERPL-MCNC: 128 MG/DL (ref 65–99)
HBA1C MFR BLD: 7 % (ref 4–5.6)
HCT VFR BLD AUTO: 45 % (ref 42–52)
HDLC SERPL-MCNC: 50 MG/DL
HGB BLD-MCNC: 14.9 G/DL (ref 14–18)
IMM GRANULOCYTES # BLD AUTO: 0.01 K/UL (ref 0–0.11)
IMM GRANULOCYTES NFR BLD AUTO: 0.2 % (ref 0–0.9)
LDLC SERPL CALC-MCNC: 157 MG/DL
LYMPHOCYTES # BLD AUTO: 1.82 K/UL (ref 1–4.8)
LYMPHOCYTES NFR BLD: 33.3 % (ref 22–41)
MCH RBC QN AUTO: 31.9 PG (ref 27–33)
MCHC RBC AUTO-ENTMCNC: 33.1 G/DL (ref 33.7–35.3)
MCV RBC AUTO: 96.4 FL (ref 81.4–97.8)
MICROALBUMIN UR-MCNC: <1.2 MG/DL
MICROALBUMIN/CREAT UR: NORMAL MG/G (ref 0–30)
MONOCYTES # BLD AUTO: 0.56 K/UL (ref 0–0.85)
MONOCYTES NFR BLD AUTO: 10.2 % (ref 0–13.4)
NEUTROPHILS # BLD AUTO: 2.97 K/UL (ref 1.82–7.42)
NEUTROPHILS NFR BLD: 54.3 % (ref 44–72)
NRBC # BLD AUTO: 0 K/UL
NRBC BLD-RTO: 0 /100 WBC
PLATELET # BLD AUTO: 283 K/UL (ref 164–446)
PMV BLD AUTO: 11.2 FL (ref 9–12.9)
POTASSIUM SERPL-SCNC: 4.2 MMOL/L (ref 3.6–5.5)
POTASSIUM SERPL-SCNC: 4.2 MMOL/L (ref 3.6–5.5)
PROT SERPL-MCNC: 7.3 G/DL (ref 6–8.2)
PROT SERPL-MCNC: 7.4 G/DL (ref 6–8.2)
RBC # BLD AUTO: 4.67 M/UL (ref 4.7–6.1)
SODIUM SERPL-SCNC: 134 MMOL/L (ref 135–145)
SODIUM SERPL-SCNC: 134 MMOL/L (ref 135–145)
T4 FREE SERPL-MCNC: 1.2 NG/DL (ref 0.93–1.7)
TRIGL SERPL-MCNC: 116 MG/DL (ref 0–149)
TSH SERPL DL<=0.005 MIU/L-ACNC: 1.34 UIU/ML (ref 0.38–5.33)
WBC # BLD AUTO: 5.5 K/UL (ref 4.8–10.8)

## 2021-12-31 PROCEDURE — 83036 HEMOGLOBIN GLYCOSYLATED A1C: CPT

## 2021-12-31 PROCEDURE — 80061 LIPID PANEL: CPT

## 2021-12-31 PROCEDURE — 82306 VITAMIN D 25 HYDROXY: CPT

## 2021-12-31 PROCEDURE — 82043 UR ALBUMIN QUANTITATIVE: CPT

## 2021-12-31 PROCEDURE — 36415 COLL VENOUS BLD VENIPUNCTURE: CPT

## 2021-12-31 PROCEDURE — 82570 ASSAY OF URINE CREATININE: CPT

## 2021-12-31 PROCEDURE — 85025 COMPLETE CBC W/AUTO DIFF WBC: CPT

## 2021-12-31 PROCEDURE — 86341 ISLET CELL ANTIBODY: CPT

## 2021-12-31 PROCEDURE — 80053 COMPREHEN METABOLIC PANEL: CPT

## 2021-12-31 PROCEDURE — 84439 ASSAY OF FREE THYROXINE: CPT

## 2021-12-31 PROCEDURE — 84681 ASSAY OF C-PEPTIDE: CPT

## 2021-12-31 PROCEDURE — 80053 COMPREHEN METABOLIC PANEL: CPT | Mod: 91

## 2021-12-31 PROCEDURE — 84443 ASSAY THYROID STIM HORMONE: CPT

## 2022-01-04 ENCOUNTER — OFFICE VISIT (OUTPATIENT)
Dept: MEDICAL GROUP | Facility: LAB | Age: 72
End: 2022-01-04
Payer: MEDICARE

## 2022-01-04 VITALS
WEIGHT: 203.6 LBS | RESPIRATION RATE: 16 BRPM | HEART RATE: 86 BPM | OXYGEN SATURATION: 96 % | SYSTOLIC BLOOD PRESSURE: 136 MMHG | DIASTOLIC BLOOD PRESSURE: 90 MMHG | TEMPERATURE: 96.6 F | BODY MASS INDEX: 30.16 KG/M2 | HEIGHT: 69 IN

## 2022-01-04 DIAGNOSIS — E78.5 DYSLIPIDEMIA: ICD-10-CM

## 2022-01-04 DIAGNOSIS — R93.1 AGATSTON CAC SCORE, >400: ICD-10-CM

## 2022-01-04 DIAGNOSIS — Z79.4 TYPE 2 DIABETES MELLITUS WITH HYPERGLYCEMIA, WITH LONG-TERM CURRENT USE OF INSULIN (HCC): ICD-10-CM

## 2022-01-04 DIAGNOSIS — E11.65 TYPE 2 DIABETES MELLITUS WITH HYPERGLYCEMIA, WITH LONG-TERM CURRENT USE OF INSULIN (HCC): ICD-10-CM

## 2022-01-04 DIAGNOSIS — E03.9 ACQUIRED HYPOTHYROIDISM: ICD-10-CM

## 2022-01-04 LAB — C PEPTIDE SERPL-MCNC: 1.7 NG/ML (ref 0.5–3.3)

## 2022-01-04 PROCEDURE — 99214 OFFICE O/P EST MOD 30 MIN: CPT | Performed by: INTERNAL MEDICINE

## 2022-01-04 RX ORDER — PROCHLORPERAZINE 25 MG/1
1 SUPPOSITORY RECTAL
Qty: 2 EACH | Refills: 1 | Status: SHIPPED | OUTPATIENT
Start: 2022-01-04 | End: 2022-10-25 | Stop reason: SDUPTHER

## 2022-01-04 RX ORDER — ASPIRIN 81 MG/1
81 TABLET, CHEWABLE ORAL ONCE
OUTPATIENT
Start: 2022-01-04 | End: 2022-01-07

## 2022-01-04 RX ORDER — ATORVASTATIN CALCIUM 20 MG/1
20 TABLET, FILM COATED ORAL DAILY
Qty: 90 TABLET | Refills: 3 | Status: SHIPPED | OUTPATIENT
Start: 2022-01-04 | End: 2022-01-05 | Stop reason: SDUPTHER

## 2022-01-04 ASSESSMENT — PATIENT HEALTH QUESTIONNAIRE - PHQ9: CLINICAL INTERPRETATION OF PHQ2 SCORE: 0

## 2022-01-04 ASSESSMENT — FIBROSIS 4 INDEX: FIB4 SCORE: 1

## 2022-01-04 NOTE — PROGRESS NOTES
CC: Lopez Calvin is a 71 y.o. male is suffering from   Chief Complaint   Patient presents with   • Follow-Up   • Insulin Dependent Diabetes     acupuncture helped lower insulin need         SUBJECTIVE:  1. Type 2 diabetes mellitus with hyperglycemia, with long-term current use of insulin (HCC)  I discussed with Torres that his diabetic control appears to be really quite good.  Patient states that he did have some dietary indiscretion during the holidays, I am still awaiting a tiara 65 antibody test to tell for dealing with type I or type 2 diabetes    2. Acquired hypothyroidism  Patient with a history of hypothyroidism recheck free T4 TSH    3. Dyslipidemia  Extensive dyslipidemia with CT cardiac scoring greater than 400, restart atorvastatin prescription rewritten    4. Agatston CAC score, >400  Discussed with the patient that he should additionally should be on aspirin every day, we have agreed to try every other day  - Aspirin        Past social, family, history: , wife is a nurse  Social History     Tobacco Use   • Smoking status: Light Tobacco Smoker     Packs/day: 0.25     Years: 30.00     Pack years: 7.50     Types: Cigars     Last attempt to quit: 2013     Years since quittin.0   • Smokeless tobacco: Never Used   • Tobacco comment: 3 cigars per week   Substance Use Topics   • Alcohol use: Yes     Alcohol/week: 4.8 oz     Types: 7 Glasses of wine, 1 Shots of liquor per week   • Drug use: Yes     Types: Marijuana     Comment: CBD oil         MEDICATIONS:    Current Outpatient Medications:   •  Continuous Blood Gluc Transmit (DEXCOM G6 TRANSMITTER) Misc, 1 Each every 3 months. E11.65, Disp: 2 Each, Rfl: 1  •  atorvastatin (LIPITOR) 20 MG Tab, Take 1 Tablet by mouth every day., Disp: 90 Tablet, Rfl: 3  •  vitamin D, Ergocalciferol, (DRISDOL) 1.25 MG (22047 UT) Cap capsule, Take 1 Capsule by mouth every 7 days., Disp: 12 Capsule, Rfl: 1  •  levothyroxine (SYNTHROID) 100 MCG Tab, Take 1 Tablet by  "mouth every morning on an empty stomach., Disp: 90 Tablet, Rfl: 3  •  Insulin Pen Needle 30G X 5 MM Misc, 1 Each every day., Disp: 90 Each, Rfl: 3    Current Facility-Administered Medications:   •  aspirin (ASA) chewable tab 81 mg, 81 mg, Oral, Once, Cedrick Perez D.O.    PROBLEMS:  Patient Active Problem List    Diagnosis Date Noted   • Vitamin D deficiency 08/31/2021   • Type 2 diabetes mellitus with hyperglycemia, with long-term current use of insulin (HCC) 05/20/2019   • ALEJANDRA (obstructive sleep apnea) 05/20/2019   • Dyslipidemia 09/10/2016   • Acquired hypothyroidism 09/10/2016   • Gastroesophageal reflux disease without esophagitis 09/10/2016       REVIEW OF SYSTEMS:  Gen.:  No Nausea, Vomiting, fever, Chills.  Heart: No chest pain.  Lungs:  No shortness of Breath.  Psychological: Robbie unusual Anxiety depression     PHYSICAL EXAM   Constitutional: Alert, cooperative, not in acute distress.  Cardiovascular:  Rate Rhythm is regular without murmurs rubs clicks.     Thorax & Lungs: Clear to auscultation, no wheezing, rhonchi, or rales  HENT: Normocephalic, Atraumatic.  Eyes: PERRLA, EOMI, Conjunctiva normal.   Neck: Trachia is midline no swelling of the thyroid.   Lymphatic: No lymphadenopathy noted.   Neurologic: Alert & oriented x 3, cranial nerves II through XII are intact, Normal motor function, Normal sensory function, No focal deficits noted.   Psychiatric: Affect normal, Judgment normal, Mood normal.     VITAL SIGNS:/90 (BP Location: Left arm, Patient Position: Sitting, BP Cuff Size: Adult)   Pulse 86   Temp 35.9 °C (96.6 °F) (Temporal)   Resp 16   Ht 1.753 m (5' 9\")   Wt 92.4 kg (203 lb 9.6 oz)   SpO2 96%   BMI 30.07 kg/m²     Labs: Reviewed    Assessment:                                                     Plan:    1. Type 2 diabetes mellitus with hyperglycemia, with long-term current use of insulin ([facility])  Pending tiara 65 antibodies, continue with current medical therapy  - Continuous " Blood Gluc Transmit (DEXCOM G6 TRANSMITTER) Misc; 1 Each every 3 months. E11.65  Dispense: 2 Each; Refill: 1  - atorvastatin (LIPITOR) 20 MG Tab; Take 1 Tablet by mouth every day.  Dispense: 90 Tablet; Refill: 3  - Lipid Profile; Future  - HEMOGLOBIN A1C; Future    2. Acquired hypothyroidism  Recheck lipid profile  - Lipid Profile; Future    3. Dyslipidemia  Recheck lipid profile  - Lipid Profile; Future    4. Agatston CAC score, >400  Start aspirin because of significantly elevated CT cardiac scoring in excess of 2455.  Encourage patient not to ignore any chest pain tightness heaviness, and seek medical attention.  Patient works as a .  - aspirin (ASA) chewable tab 81 mg  - Lipid Profile; Future      dyslip  elev CAC   DM ? Type 1 vs type 2.

## 2022-01-05 DIAGNOSIS — Z79.4 TYPE 2 DIABETES MELLITUS WITH HYPERGLYCEMIA, WITH LONG-TERM CURRENT USE OF INSULIN (HCC): ICD-10-CM

## 2022-01-05 DIAGNOSIS — E11.65 TYPE 2 DIABETES MELLITUS WITH HYPERGLYCEMIA, WITH LONG-TERM CURRENT USE OF INSULIN (HCC): ICD-10-CM

## 2022-01-05 LAB — GAD65 AB SER IA-ACNC: 43 IU/ML (ref 0–5)

## 2022-01-05 RX ORDER — ATORVASTATIN CALCIUM 20 MG/1
20 TABLET, FILM COATED ORAL DAILY
Qty: 90 TABLET | Refills: 3 | Status: SHIPPED | OUTPATIENT
Start: 2022-01-05 | End: 2023-03-14

## 2022-01-05 NOTE — TELEPHONE ENCOUNTER
----- Message from Lopez Calvin sent at 1/4/2022  5:04 PM PST -----  Regarding: Change my Pharmacy to the [facility] on [address]  Yes. Thank you. Please send the Rx from Dr. Perez wrote for me this morning to [facility]. I think it was only an Rx for Lipitor or something similar.

## 2022-01-18 ENCOUNTER — TELEPHONE (OUTPATIENT)
Dept: ENDOCRINOLOGY | Facility: MEDICAL CENTER | Age: 72
End: 2022-01-18

## 2022-01-18 NOTE — TELEPHONE ENCOUNTER
PT lm and called to reschedule cancelled 03/16 appt. We rescheduled for 02/16 as he said Dr. Baltazar wanted to see him earlier

## 2022-02-08 ENCOUNTER — TELEPHONE (OUTPATIENT)
Dept: ENDOCRINOLOGY | Facility: MEDICAL CENTER | Age: 72
End: 2022-02-08

## 2022-02-08 NOTE — TELEPHONE ENCOUNTER
VOICEMAIL  1. Caller Name: Lopez Davison English                        Call Back Number: [phone] (home)      2. Message: Patient called and left a message stating that he needed lab work ordered up before his upcoming appointment on 02/16. Please order up lab work and I will call the patient.    Thank you.     3. Patient approves office to leave a detailed voicemail/[employer] message: N\A

## 2022-02-13 DIAGNOSIS — E55.9 VITAMIN D DEFICIENCY: ICD-10-CM

## 2022-02-14 RX ORDER — ERGOCALCIFEROL 1.25 MG/1
CAPSULE ORAL
Qty: 12 CAPSULE | Refills: 1 | Status: SHIPPED | OUTPATIENT
Start: 2022-02-14 | End: 2022-07-01

## 2022-02-14 NOTE — TELEPHONE ENCOUNTER
Received request via: Pharmacy    Was the patient seen in the last year in this department? Yes    Does the patient have an active prescription (recently filled or refills available) for medication(s) requested? No     REQUESTED MEDICATION:   Requested Prescriptions     Pending Prescriptions Disp Refills   • vitamin D2, Ergocalciferol, (DRISDOL) 1.25 MG (40600 UT) Cap capsule [Pharmacy Med Name: VITAMIN D2 50,000IU (ERGO) CAP RX] 12 Capsule 1     Sig: TAKE 1 CAPSULE BY MOUTH ONCE A WEEK

## 2022-02-16 ENCOUNTER — OFFICE VISIT (OUTPATIENT)
Dept: ENDOCRINOLOGY | Facility: MEDICAL CENTER | Age: 72
End: 2022-02-16
Attending: INTERNAL MEDICINE
Payer: MEDICARE

## 2022-02-16 VITALS
WEIGHT: 204 LBS | OXYGEN SATURATION: 95 % | SYSTOLIC BLOOD PRESSURE: 124 MMHG | DIASTOLIC BLOOD PRESSURE: 80 MMHG | BODY MASS INDEX: 30.21 KG/M2 | HEART RATE: 84 BPM | HEIGHT: 69 IN

## 2022-02-16 DIAGNOSIS — E03.9 ACQUIRED HYPOTHYROIDISM: ICD-10-CM

## 2022-02-16 DIAGNOSIS — E13.9 LADA (LATENT AUTOIMMUNE DIABETES IN ADULTS), MANAGED AS TYPE 2 (HCC): ICD-10-CM

## 2022-02-16 DIAGNOSIS — E78.5 DYSLIPIDEMIA: ICD-10-CM

## 2022-02-16 DIAGNOSIS — E55.9 VITAMIN D DEFICIENCY: ICD-10-CM

## 2022-02-16 PROCEDURE — 95251 CONT GLUC MNTR ANALYSIS I&R: CPT | Performed by: INTERNAL MEDICINE

## 2022-02-16 PROCEDURE — 99212 OFFICE O/P EST SF 10 MIN: CPT | Performed by: INTERNAL MEDICINE

## 2022-02-16 PROCEDURE — 99214 OFFICE O/P EST MOD 30 MIN: CPT | Performed by: INTERNAL MEDICINE

## 2022-02-16 RX ORDER — ASPIRIN 81 MG/1
81 TABLET, CHEWABLE ORAL DAILY
COMMUNITY

## 2022-02-16 ASSESSMENT — FIBROSIS 4 INDEX: FIB4 SCORE: 1

## 2022-02-16 NOTE — PROGRESS NOTES
CHIEF COMPLAINT: Patient is here for follow up of Type 2 Diabetes Mellitus    HPI:     Lopez Calvin is a 71 y.o. male with Type 2 Diabetes Mellitus here for follow up.    Labs from 2/16/2022 HbA1c is 7.0%  Labs from 12/7/2021 show Hba1c was 6.6%  His baseline was 14.5% on 7/17/2021 - CGM was started       He has a history of a hiatal hernia (s/p fundoplication), hyperlipidemia, hypothyroidism,     He actually has KI and he is concerned that t may be covid related.  His c-peptide was 1.7 and DIANNE 65 was 43 on 12/2021    He was fortunately approved by his insurance for Dexcom G6 CGM.  He has been on the CGM now for at least 6 months and he feels that it has helped him a lot to get his sugars down      He admits that he has been taking Soliqua regularly and he has been taking it as needed  (8 units prn).  This is because he is not used to seeing normal sugars      We discussed that he would do better BG- wise if he took the Soliqua daily as it needs to be used in this specific way.     He has been wearing a CGM for over 6 mos  Average BG is 170 with time in range of 63%      He has hyperlipidemia and he claims he is now taking atorvastatin 20mg daily  LD-C was 157 on 12/31/2021      He doesnt have diabetic kidney dise  UACR was < 30 on 12/2021      He denies a history of foot sores  He denies diabetic neuropathy symptoms      He denies diabetic retinopathy  His last eye exam was on April 29, 2021      He has hypothyroidism and is on Levothyroxine 100mcg daily  TSH was 1.34 on 12/31/2021            BG Diary:  CGM was downloaded and reviewed     Weight has been stable    Diabetes Complications   Retinopathy: No known retinopathy.  Last eye exam: 4/29/2021  Neuropathy: Denies paresthesias or numbness in hands or feet. Denies any foot wounds.  Exercise: Minimal.  Diet: Fair.  Patient's medications, allergies, and social histories were reviewed and updated as appropriate.    ROS:     CONS:     No fever, no chills    EYES:     No diplopia, no blurry vision   CV:           No chest pain, no palpitations   PULM:     No SOB, no cough, no hemoptysis.   GI:            No nausea, no vomiting, no diarrhea, no constipation   ENDO:     No polyuria, no polydipsia, no heat intolerance, no cold intolerance       Past Medical History:  Problem List:  2021: Vitamin D deficiency  2019: Type 2 diabetes mellitus with hyperglycemia, with long-term   current use of insulin ([facility])  2019: ALEJANDRA (obstructive sleep apnea)  2017: Bilateral inguinal hernia  2016: IFG (impaired fasting glucose)  2016: Dyslipidemia  2016: Acquired hypothyroidism  2016: Gastroesophageal reflux disease without esophagitis      Past Surgical History:  Past Surgical History:   Procedure Laterality Date   • INGUINAL HERNIA REPAIR ROBOTIC Bilateral 3/3/2017    Procedure: Robot Assisted/Laparoscopic Repair Bilateral Inguinal Hernias with Mesh;  Surgeon: Champ Garber M.D.;  Location: SURGERY [facility];  Service:    • SINUSCOPY Bilateral    • CATARACT EXTRACTION WITH IOL Bilateral    • TONSILLECTOMY AND ADENOIDECTOMY          Allergies:  Azithromycin, Cefpodoxime, Levofloxacin, Oseltamivir, and Tamiflu     Social History:  Social History     Tobacco Use   • Smoking status: Light Tobacco Smoker     Packs/day: 0.25     Years: 30.00     Pack years: 7.50     Types: Cigars     Last attempt to quit: 2013     Years since quittin.1   • Smokeless tobacco: Never Used   • Tobacco comment: 3 cigars per week   Substance Use Topics   • Alcohol use: Yes     Alcohol/week: 4.8 oz     Types: 7 Glasses of wine, 1 Shots of liquor per week   • Drug use: Yes     Types: Marijuana     Comment: CBD oil        Family History:   family history includes Cancer in his father; Heart Disease in his maternal grandmother and mother; No Known Problems in his brother, brother, maternal grandfather, and sister; Other in his father and paternal grandfather; Stroke in his  "maternal grandmother and mother.      PHYSICAL EXAM:   OBJECTIVE:  Vital signs: /80 (BP Location: Left arm, Patient Position: Sitting, BP Cuff Size: Adult)   Pulse 84   Ht 1.753 m (5' 9\")   Wt 92.5 kg (204 lb)   SpO2 95%   BMI 30.13 kg/m²   GENERAL: Well-developed, well-nourished in no apparent distress.   EYE:  No ocular asymmetry, PERRLA  HENT: Pink, moist mucous membranes.    NECK: No thyromegaly.   CARDIOVASCULAR:  No murmurs  LUNGS: Clear breath sounds  ABDOMEN: Soft, nontender   EXTREMITIES: No clubbing, cyanosis, or edema.   NEUROLOGICAL: No gross focal motor abnormalities   LYMPH: No cervical adenopathy palpated.   SKIN: No rashes, lesions.     Labs:  Lab Results   Component Value Date/Time    HBA1C 7.0 (H) 12/31/2021 06:25 AM        Lab Results   Component Value Date/Time    WBC 5.5 12/31/2021 06:25 AM    RBC 4.67 (L) 12/31/2021 06:25 AM    HEMOGLOBIN 14.9 12/31/2021 06:25 AM    MCV 96.4 12/31/2021 06:25 AM    MCH 31.9 12/31/2021 06:25 AM    MCHC 33.1 (L) 12/31/2021 06:25 AM    RDW 47.4 12/31/2021 06:25 AM    MPV 11.2 12/31/2021 06:25 AM       Lab Results   Component Value Date/Time    SODIUM 134 (L) 12/31/2021 06:25 AM    POTASSIUM 4.2 12/31/2021 06:25 AM    CHLORIDE 99 12/31/2021 06:25 AM    CO2 22 12/31/2021 06:25 AM    ANION 13.0 12/31/2021 06:25 AM    GLUCOSE 127 (H) 12/31/2021 06:25 AM    BUN 18 12/31/2021 06:25 AM    CREATININE 0.81 12/31/2021 06:25 AM    CALCIUM 9.5 12/31/2021 06:25 AM    ASTSGOT 22 12/31/2021 06:25 AM    ALTSGPT 29 12/31/2021 06:25 AM    TBILIRUBIN 0.5 12/31/2021 06:25 AM    ALBUMIN 4.3 12/31/2021 06:25 AM    TOTPROTEIN 7.3 12/31/2021 06:25 AM    GLOBULIN 3.0 12/31/2021 06:25 AM    AGRATIO 1.4 12/31/2021 06:25 AM       Lab Results   Component Value Date/Time    CHOLSTRLTOT 230 (H) 12/31/2021 0608    TRIGLYCERIDE 116 12/31/2021 0608    HDL 50 12/31/2021 0608     (H) 12/31/2021 0608       Lab Results   Component Value Date/Time    MALBCRT see below 12/31/2021 06:07 " AM    MICROALBUR <1.2 12/31/2021 06:07 AM        Lab Results   Component Value Date/Time    TSHULTRASEN 1.340 12/31/2021 0625     No results found for: FREEDIR  No results found for: FREET3  No results found for: THYSTIMIG        ASSESSMENT/PLAN:     1. 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  Because he is afraid of low sugars I recommend that he take 8 units every day to target a fasting sugar of 100-130  He can call me if he has problems  We will check his serum creatinine, urine albumin, fasting lipids and other labs in 3 months  Follow-up with me in 3 months      2. Acquired hypothyroidism  Controlled  Continue levothyroxine 100 MCG daily  We will repeat TSH levels in 3 months    3. Dyslipidemia  Unstable  Continue atorvastatin 20 mg daily  Repeat fasting lipids in 3 months to assess compliance    4. Vitamin D deficiency  Controlled  Last vitamin D was 36  Continue vitamin D replacement therapy  Repeat labs in 3 to 6 months      Return in about 3 months (around 5/16/2022).      Thank you kindly for allowing me to participate in the diabetes care plan for this patient.    Kane Baltazar MD, FACE, [facility]  02/16/22    CC:   Cedrick Perez D.O.

## 2022-04-11 ENCOUNTER — APPOINTMENT (OUTPATIENT)
Dept: MEDICAL GROUP | Facility: IMAGING CENTER | Age: 72
End: 2022-04-11

## 2022-04-13 ENCOUNTER — HOSPITAL ENCOUNTER (OUTPATIENT)
Dept: LAB | Facility: MEDICAL CENTER | Age: 72
End: 2022-04-13
Attending: INTERNAL MEDICINE
Payer: MEDICARE

## 2022-04-13 DIAGNOSIS — E78.5 DYSLIPIDEMIA: ICD-10-CM

## 2022-04-13 DIAGNOSIS — E11.65 TYPE 2 DIABETES MELLITUS WITH HYPERGLYCEMIA, WITH LONG-TERM CURRENT USE OF INSULIN (HCC): ICD-10-CM

## 2022-04-13 DIAGNOSIS — Z79.4 TYPE 2 DIABETES MELLITUS WITH HYPERGLYCEMIA, WITH LONG-TERM CURRENT USE OF INSULIN (HCC): ICD-10-CM

## 2022-04-13 DIAGNOSIS — E03.9 ACQUIRED HYPOTHYROIDISM: ICD-10-CM

## 2022-04-13 DIAGNOSIS — R93.1 AGATSTON CAC SCORE, >400: ICD-10-CM

## 2022-04-13 DIAGNOSIS — E13.9 LADA (LATENT AUTOIMMUNE DIABETES IN ADULTS), MANAGED AS TYPE 2 (HCC): ICD-10-CM

## 2022-04-13 DIAGNOSIS — E55.9 VITAMIN D DEFICIENCY: ICD-10-CM

## 2022-04-13 LAB
25(OH)D3 SERPL-MCNC: 43 NG/ML (ref 30–100)
ALBUMIN SERPL BCP-MCNC: 4.7 G/DL (ref 3.2–4.9)
ALBUMIN/GLOB SERPL: 1.7 G/DL
ALP SERPL-CCNC: 75 U/L (ref 30–99)
ALT SERPL-CCNC: 32 U/L (ref 2–50)
ANION GAP SERPL CALC-SCNC: 11 MMOL/L (ref 7–16)
AST SERPL-CCNC: 22 U/L (ref 12–45)
BILIRUB SERPL-MCNC: 0.8 MG/DL (ref 0.1–1.5)
BUN SERPL-MCNC: 10 MG/DL (ref 8–22)
CALCIUM SERPL-MCNC: 9.1 MG/DL (ref 8.5–10.5)
CHLORIDE SERPL-SCNC: 97 MMOL/L (ref 96–112)
CHOLEST SERPL-MCNC: 156 MG/DL (ref 100–199)
CHOLEST SERPL-MCNC: 156 MG/DL (ref 100–199)
CO2 SERPL-SCNC: 24 MMOL/L (ref 20–33)
CREAT SERPL-MCNC: 0.8 MG/DL (ref 0.5–1.4)
CREAT UR-MCNC: 268.38 MG/DL
EST. AVERAGE GLUCOSE BLD GHB EST-MCNC: 177 MG/DL
GFR SERPLBLD CREATININE-BSD FMLA CKD-EPI: 96 ML/MIN/1.73 M 2
GLOBULIN SER CALC-MCNC: 2.8 G/DL (ref 1.9–3.5)
GLUCOSE SERPL-MCNC: 148 MG/DL (ref 65–99)
HBA1C MFR BLD: 7.8 % (ref 4–5.6)
HDLC SERPL-MCNC: 49 MG/DL
HDLC SERPL-MCNC: 50 MG/DL
LDLC SERPL CALC-MCNC: 85 MG/DL
LDLC SERPL CALC-MCNC: 86 MG/DL
MICROALBUMIN UR-MCNC: <1.2 MG/DL
MICROALBUMIN/CREAT UR: NORMAL MG/G (ref 0–30)
POTASSIUM SERPL-SCNC: 4.2 MMOL/L (ref 3.6–5.5)
PROT SERPL-MCNC: 7.5 G/DL (ref 6–8.2)
SODIUM SERPL-SCNC: 132 MMOL/L (ref 135–145)
T4 FREE SERPL-MCNC: 1.46 NG/DL (ref 0.93–1.7)
TRIGL SERPL-MCNC: 103 MG/DL (ref 0–149)
TRIGL SERPL-MCNC: 106 MG/DL (ref 0–149)
TSH SERPL DL<=0.005 MIU/L-ACNC: 2.77 UIU/ML (ref 0.38–5.33)

## 2022-04-13 PROCEDURE — 82306 VITAMIN D 25 HYDROXY: CPT

## 2022-04-13 PROCEDURE — 80061 LIPID PANEL: CPT

## 2022-04-13 PROCEDURE — 83036 HEMOGLOBIN GLYCOSYLATED A1C: CPT | Mod: GA

## 2022-04-13 PROCEDURE — 82570 ASSAY OF URINE CREATININE: CPT

## 2022-04-13 PROCEDURE — 82043 UR ALBUMIN QUANTITATIVE: CPT

## 2022-04-13 PROCEDURE — 84443 ASSAY THYROID STIM HORMONE: CPT

## 2022-04-13 PROCEDURE — 84439 ASSAY OF FREE THYROXINE: CPT

## 2022-04-13 PROCEDURE — 36415 COLL VENOUS BLD VENIPUNCTURE: CPT

## 2022-04-13 PROCEDURE — 80053 COMPREHEN METABOLIC PANEL: CPT

## 2022-04-13 PROCEDURE — 80061 LIPID PANEL: CPT | Mod: 91

## 2022-04-18 ENCOUNTER — APPOINTMENT (OUTPATIENT)
Dept: MEDICAL GROUP | Facility: IMAGING CENTER | Age: 72
End: 2022-04-18

## 2022-04-20 ENCOUNTER — OFFICE VISIT (OUTPATIENT)
Dept: MEDICAL GROUP | Facility: LAB | Age: 72
End: 2022-04-20
Payer: MEDICARE

## 2022-04-20 VITALS
HEART RATE: 80 BPM | WEIGHT: 208.4 LBS | TEMPERATURE: 96.6 F | SYSTOLIC BLOOD PRESSURE: 110 MMHG | OXYGEN SATURATION: 95 % | DIASTOLIC BLOOD PRESSURE: 70 MMHG | BODY MASS INDEX: 30.87 KG/M2 | HEIGHT: 69 IN

## 2022-04-20 DIAGNOSIS — E11.65 TYPE 2 DIABETES MELLITUS WITH HYPERGLYCEMIA, WITH LONG-TERM CURRENT USE OF INSULIN (HCC): ICD-10-CM

## 2022-04-20 DIAGNOSIS — Z79.4 TYPE 2 DIABETES MELLITUS WITH HYPERGLYCEMIA, WITH LONG-TERM CURRENT USE OF INSULIN (HCC): ICD-10-CM

## 2022-04-20 PROCEDURE — 99213 OFFICE O/P EST LOW 20 MIN: CPT | Performed by: INTERNAL MEDICINE

## 2022-04-20 ASSESSMENT — FIBROSIS 4 INDEX: FIB4 SCORE: 0.95

## 2022-04-20 NOTE — PROGRESS NOTES
CC: Lopez Calvin is a 71 y.o. male is suffering from   Chief Complaint   Patient presents with   • Follow-Up         SUBJECTIVE:  1. Type 2 diabetes mellitus with hyperglycemia, with long-term current use of insulin (HCC)  Lopez is here for follow-up, works as a , has just completed busy season, is traveling to [facility] to get away from it all.  Patient states he does smoke cigars occasionally with a glass of wine.  Encourage patient not to do it in the sunlight        Past social, family, history: ,   Social History     Tobacco Use   • Smoking status: Light Tobacco Smoker     Packs/day: 0.25     Years: 30.00     Pack years: 7.50     Types: Cigars     Last attempt to quit: 2013     Years since quittin.3   • Smokeless tobacco: Never Used   • Tobacco comment: 3 cigars per week   Substance Use Topics   • Alcohol use: Yes     Alcohol/week: 4.8 oz     Types: 7 Glasses of wine, 1 Shots of liquor per week   • Drug use: Yes     Types: Marijuana     Comment: CBD oil         MEDICATIONS:    Current Outpatient Medications:   •  aspirin (ASPIRIN 81) 81 MG Chew Tab chewable tablet, Chew 81 mg every day., Disp: , Rfl:   •  vitamin D2, Ergocalciferol, (DRISDOL) 1.25 MG (29077 UT) Cap capsule, TAKE 1 CAPSULE BY MOUTH ONCE A WEEK, Disp: 12 Capsule, Rfl: 1  •  atorvastatin (LIPITOR) 20 MG Tab, Take 1 Tablet by mouth every day., Disp: 90 Tablet, Rfl: 3  •  Continuous Blood Gluc Transmit (DEXCOM G6 TRANSMITTER) Misc, 1 Each every 3 months. E11.65, Disp: 2 Each, Rfl: 1  •  levothyroxine (SYNTHROID) 100 MCG Tab, Take 1 Tablet by mouth every morning on an empty stomach., Disp: 90 Tablet, Rfl: 3  •  Insulin Pen Needle 30G X 5 MM Misc, 1 Each every day., Disp: 90 Each, Rfl: 3    PROBLEMS:  Patient Active Problem List    Diagnosis Date Noted   • Vitamin D deficiency 2021   • Type 2 diabetes mellitus with hyperglycemia, with long-term current use of insulin (HCC) 2019   • ALEJANDRA  "(obstructive sleep apnea) 05/20/2019   • Dyslipidemia 09/10/2016   • Acquired hypothyroidism 09/10/2016   • Gastroesophageal reflux disease without esophagitis 09/10/2016       REVIEW OF SYSTEMS:  Gen.:  No Nausea, Vomiting, fever, Chills.  Heart: No chest pain.  Lungs:  No shortness of Breath.  Psychological: Robbie unusual Anxiety depression     PHYSICAL EXAM   Constitutional: Alert, cooperative, not in acute distress.  Cardiovascular:  Rate Rhythm is regular without murmurs rubs clicks.     Thorax & Lungs: Clear to auscultation, no wheezing, rhonchi, or rales  HENT: Normocephalic, Atraumatic.  Eyes: PERRLA, EOMI, Conjunctiva normal.   Neck: Trachia is midline no swelling of the thyroid.   Lymphatic: No lymphadenopathy noted.   Neurologic: Alert & oriented x 3, cranial nerves II through XII are intact, Normal motor function, Normal sensory function, No focal deficits noted.   Psychiatric: Affect normal, Judgment normal, Mood normal.     VITAL SIGNS:/70   Pulse 80   Temp 35.9 °C (96.6 °F) (Temporal)   Ht 1.753 m (5' 9\")   Wt 94.5 kg (208 lb 6.4 oz)   SpO2 95%   BMI 30.78 kg/m²     Labs: Reviewed    Assessment:                                                     Plan:    1. Type 2 diabetes mellitus with hyperglycemia, with long-term current use of insulin ([facility])  Type 2 diabetes, with reasonable control, encourage patient work on his weight.  We will recheck in 4 months  - Hemoglobin A1c; Future        "

## 2022-04-25 ENCOUNTER — APPOINTMENT (OUTPATIENT)
Dept: MEDICAL GROUP | Facility: IMAGING CENTER | Age: 72
End: 2022-04-25

## 2022-05-18 ENCOUNTER — TELEPHONE (OUTPATIENT)
Dept: MEDICAL GROUP | Facility: LAB | Age: 72
End: 2022-05-18
Payer: MEDICARE

## 2022-05-18 DIAGNOSIS — Z79.4 TYPE 2 DIABETES MELLITUS WITH HYPERGLYCEMIA, WITH LONG-TERM CURRENT USE OF INSULIN (HCC): ICD-10-CM

## 2022-05-18 DIAGNOSIS — E11.65 TYPE 2 DIABETES MELLITUS WITH HYPERGLYCEMIA, WITH LONG-TERM CURRENT USE OF INSULIN (HCC): ICD-10-CM

## 2022-05-31 ENCOUNTER — OFFICE VISIT (OUTPATIENT)
Dept: ENDOCRINOLOGY | Facility: MEDICAL CENTER | Age: 72
End: 2022-05-31
Attending: INTERNAL MEDICINE
Payer: MEDICARE

## 2022-05-31 VITALS
HEART RATE: 80 BPM | BODY MASS INDEX: 29.78 KG/M2 | HEIGHT: 70 IN | DIASTOLIC BLOOD PRESSURE: 66 MMHG | WEIGHT: 208 LBS | SYSTOLIC BLOOD PRESSURE: 106 MMHG | OXYGEN SATURATION: 94 %

## 2022-05-31 DIAGNOSIS — Z79.4 TYPE 2 DIABETES MELLITUS WITH HYPERGLYCEMIA, WITH LONG-TERM CURRENT USE OF INSULIN (HCC): ICD-10-CM

## 2022-05-31 DIAGNOSIS — E11.65 TYPE 2 DIABETES MELLITUS WITH HYPERGLYCEMIA, WITH LONG-TERM CURRENT USE OF INSULIN (HCC): ICD-10-CM

## 2022-05-31 PROCEDURE — 95250 CONT GLUC MNTR PHYS/QHP EQP: CPT | Performed by: INTERNAL MEDICINE

## 2022-05-31 PROCEDURE — 99214 OFFICE O/P EST MOD 30 MIN: CPT | Performed by: INTERNAL MEDICINE

## 2022-05-31 PROCEDURE — 99212 OFFICE O/P EST SF 10 MIN: CPT | Mod: 27 | Performed by: INTERNAL MEDICINE

## 2022-05-31 ASSESSMENT — FIBROSIS 4 INDEX: FIB4 SCORE: 0.95

## 2022-05-31 NOTE — PROGRESS NOTES
CHIEF COMPLAINT: Patient is here for follow up of Type 2 Diabetes Mellitus    HPI:     Lopez Calvin is a 71 y.o. male with Type 2 Diabetes Mellitus here for follow up.    Labs from  4/13/2022 show Hba1c is 7.8%  Labs from 2/16/2022 show HbA1c was 7.0%  Labs from 12/7/2021 show Hba1c was 6.6%  His baseline was 14.5% on 7/17/2021 - CGM was started         He has a history of a hiatal hernia (s/p fundoplication), hyperlipidemia, hypothyroidism,   He actually has KI  (relationship Covid ?)   His c-peptide was 1.7 and DIANNE 65 was 43 on 12/2021  He has been on the CGM now for at least 6 months        On follow up he is taking Soliqua 10u   6 days out of 7  He takes it at night      He has been wearing a CGM for over 6 mos (Dexcom)  Average BG is 150 with time in range of 82%  There is a trend for higher Bg in the afternoon and dinner        He has hyperlipidemia and he is on atorvastatin 20mg daily  LDL-C was 85 on 4/2022      He doesnt have diabetic kidney dise  UACR was < 30 on 4/2022      He denies a history of foot sores  He denies diabetic neuropathy symptoms      He denies diabetic retinopathy  His last eye exam was on 5/16/2022      He has hypothyroidism and is on Levothyroxine 100mcg daily  TSH is on 2.7 on 4/13/2022  TSH was 1.34 on 12/31/2021            BG Diary:  CGM was downloaded and reviewed     Weight has been stable    Diabetes Complications   Retinopathy: No known retinopathy.  Last eye exam: 5/16/2022  Neuropathy: Denies paresthesias or numbness in hands or feet. Denies any foot wounds.  Exercise: Minimal.  Diet: Fair.  Patient's medications, allergies, and social histories were reviewed and updated as appropriate.    ROS:     CONS:     No fever, no chills   EYES:     No diplopia, no blurry vision   CV:           No chest pain, no palpitations   PULM:     No SOB, no cough, no hemoptysis.   GI:            No nausea, no vomiting, no diarrhea, no constipation   ENDO:     No polyuria, no polydipsia,  "no heat intolerance, no cold intolerance       Past Medical History:  Problem List:  2021: Vitamin D deficiency  2019: Type 2 diabetes mellitus with hyperglycemia, with long-term   current use of insulin ([facility])  2019: ALEJANDRA (obstructive sleep apnea)  2017: Bilateral inguinal hernia  2016: IFG (impaired fasting glucose)  2016: Dyslipidemia  2016: Acquired hypothyroidism  2016: Gastroesophageal reflux disease without esophagitis      Past Surgical History:  Past Surgical History:   Procedure Laterality Date   • INGUINAL HERNIA REPAIR ROBOTIC Bilateral 3/3/2017    Procedure: Robot Assisted/Laparoscopic Repair Bilateral Inguinal Hernias with Mesh;  Surgeon: Champ Garber M.D.;  Location: SURGERY [facility];  Service:    • SINUSCOPY Bilateral    • CATARACT EXTRACTION WITH IOL Bilateral    • TONSILLECTOMY AND ADENOIDECTOMY          Allergies:  Azithromycin, Cefpodoxime, Levofloxacin, Oseltamivir, and Tamiflu     Social History:  Social History     Tobacco Use   • Smoking status: Light Tobacco Smoker     Packs/day: 0.25     Years: 30.00     Pack years: 7.50     Types: Cigars     Last attempt to quit: 2013     Years since quittin.4   • Smokeless tobacco: Never Used   • Tobacco comment: 3 cigars per week   Substance Use Topics   • Alcohol use: Yes     Alcohol/week: 4.8 oz     Types: 7 Glasses of wine, 1 Shots of liquor per week   • Drug use: Yes     Types: Marijuana     Comment: CBD oil        Family History:   family history includes Cancer in his father; Heart Disease in his maternal grandmother and mother; No Known Problems in his brother, brother, maternal grandfather, and sister; Other in his father and paternal grandfather; Stroke in his maternal grandmother and mother.      PHYSICAL EXAM:   OBJECTIVE:  Vital signs: /66 (BP Location: Left arm, Patient Position: Sitting, BP Cuff Size: Adult)   Pulse 80   Ht 1.765 m (5' 9.5\")   Wt 94.3 kg (208 lb)   SpO2 94%   BMI 30.28 " kg/m²   GENERAL: Well-developed, well-nourished in no apparent distress.   EYE:  No ocular asymmetry, PERRLA  HENT: Pink, moist mucous membranes.    NECK: No thyromegaly.   CARDIOVASCULAR:  No murmurs  LUNGS: Clear breath sounds  ABDOMEN: Soft, nontender   EXTREMITIES: No clubbing, cyanosis, or edema.   NEUROLOGICAL: No gross focal motor abnormalities   LYMPH: No cervical adenopathy palpated.   SKIN: No rashes, lesions.     Labs:  Lab Results   Component Value Date/Time    HBA1C 7.8 (H) 04/13/2022 07:09 AM        Lab Results   Component Value Date/Time    WBC 5.5 12/31/2021 06:25 AM    RBC 4.67 (L) 12/31/2021 06:25 AM    HEMOGLOBIN 14.9 12/31/2021 06:25 AM    MCV 96.4 12/31/2021 06:25 AM    MCH 31.9 12/31/2021 06:25 AM    MCHC 33.1 (L) 12/31/2021 06:25 AM    RDW 47.4 12/31/2021 06:25 AM    MPV 11.2 12/31/2021 06:25 AM       Lab Results   Component Value Date/Time    SODIUM 132 (L) 04/13/2022 07:08 AM    POTASSIUM 4.2 04/13/2022 07:08 AM    CHLORIDE 97 04/13/2022 07:08 AM    CO2 24 04/13/2022 07:08 AM    ANION 11.0 04/13/2022 07:08 AM    GLUCOSE 148 (H) 04/13/2022 07:08 AM    BUN 10 04/13/2022 07:08 AM    CREATININE 0.80 04/13/2022 07:08 AM    CALCIUM 9.1 04/13/2022 07:08 AM    ASTSGOT 22 04/13/2022 07:08 AM    ALTSGPT 32 04/13/2022 07:08 AM    TBILIRUBIN 0.8 04/13/2022 07:08 AM    ALBUMIN 4.7 04/13/2022 07:08 AM    TOTPROTEIN 7.5 04/13/2022 07:08 AM    GLOBULIN 2.8 04/13/2022 07:08 AM    AGRATIO 1.7 04/13/2022 07:08 AM       Lab Results   Component Value Date/Time    CHOLSTRLTOT 230 (H) 12/31/2021 0608    TRIGLYCERIDE 116 12/31/2021 0608    HDL 50 12/31/2021 0608     (H) 12/31/2021 0608       Lab Results   Component Value Date/Time    MALBCRT see below 04/13/2022 07:08 AM    MICROALBUR <1.2 04/13/2022 07:08 AM        Lab Results   Component Value Date/Time    TSHULTRASEN 1.340 12/31/2021 0625     No results found for: FREEDIR  No results found for: FREET3  No results found for:  THYSTIMIG        ASSESSMENT/PLAN:     1. 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 Acquired hypothyroidism  Controlled  Continue levothyroxine 100 MCG daily  We will repeat TSH levels in 6 months    3. Dyslipidemia  Controlled   Continue atorvastatin 20 mg daily  Repeat fasting lipids in 12 months to assess compliance    4. Vitamin D deficiency  Controlled  Last vitamin D was 43  Continue vitamin D replacement therapy  Repeat labs in 3 to 6 months      Return in about 3 months (around 8/31/2022).      Thank you kindly for allowing me to participate in the diabetes care plan for this patient.    Kane Baltazar MD, FACE, [facility]  02/16/22    CC:   Cedrick Perez D.O.

## 2022-07-01 DIAGNOSIS — E55.9 VITAMIN D DEFICIENCY: ICD-10-CM

## 2022-07-01 RX ORDER — ERGOCALCIFEROL 1.25 MG/1
CAPSULE ORAL
Qty: 12 CAPSULE | Refills: 1 | Status: SHIPPED | OUTPATIENT
Start: 2022-07-01 | End: 2022-12-26

## 2022-07-01 NOTE — TELEPHONE ENCOUNTER
Received request via: Pharmacy    Was the patient seen in the last year in this department? Yes    Does the patient have an active prescription (recently filled or refills available) for medication(s) requested?no

## 2022-08-27 DIAGNOSIS — E03.9 ACQUIRED HYPOTHYROIDISM: ICD-10-CM

## 2022-08-29 RX ORDER — LEVOTHYROXINE SODIUM 0.1 MG/1
TABLET ORAL
Qty: 90 TABLET | Refills: 3 | Status: SHIPPED | OUTPATIENT
Start: 2022-08-29

## 2022-09-06 DIAGNOSIS — Z79.4 TYPE 2 DIABETES MELLITUS WITH HYPERGLYCEMIA, WITH LONG-TERM CURRENT USE OF INSULIN (HCC): ICD-10-CM

## 2022-09-06 DIAGNOSIS — E11.65 TYPE 2 DIABETES MELLITUS WITH HYPERGLYCEMIA, WITH LONG-TERM CURRENT USE OF INSULIN (HCC): ICD-10-CM

## 2022-09-07 RX ORDER — INSULIN GLARGINE AND LIXISENATIDE 100; 33 U/ML; UG/ML
INJECTION, SOLUTION SUBCUTANEOUS
Qty: 15 ML | Refills: 3 | Status: SHIPPED | OUTPATIENT
Start: 2022-09-07 | End: 2022-10-25 | Stop reason: SDUPTHER

## 2022-09-15 DIAGNOSIS — Z79.4 TYPE 2 DIABETES MELLITUS WITH HYPERGLYCEMIA, WITH LONG-TERM CURRENT USE OF INSULIN (HCC): ICD-10-CM

## 2022-09-15 DIAGNOSIS — E11.65 TYPE 2 DIABETES MELLITUS WITH HYPERGLYCEMIA, WITH LONG-TERM CURRENT USE OF INSULIN (HCC): ICD-10-CM

## 2022-09-15 RX ORDER — PROCHLORPERAZINE 25 MG/1
SUPPOSITORY RECTAL
Qty: 9 EACH | Refills: 3 | Status: SHIPPED | OUTPATIENT
Start: 2022-09-15 | End: 2022-10-25 | Stop reason: SDUPTHER

## 2022-09-15 RX ORDER — PROCHLORPERAZINE 25 MG/1
SUPPOSITORY RECTAL
Qty: 9 EACH | Refills: 3 | Status: SHIPPED | OUTPATIENT
Start: 2022-09-15 | End: 2022-10-25

## 2022-09-29 DIAGNOSIS — E11.65 TYPE 2 DIABETES MELLITUS WITH HYPERGLYCEMIA, WITH LONG-TERM CURRENT USE OF INSULIN (HCC): ICD-10-CM

## 2022-09-29 DIAGNOSIS — Z79.4 TYPE 2 DIABETES MELLITUS WITH HYPERGLYCEMIA, WITH LONG-TERM CURRENT USE OF INSULIN (HCC): ICD-10-CM

## 2022-09-30 RX ORDER — PROCHLORPERAZINE 25 MG/1
SUPPOSITORY RECTAL
Qty: 9 EACH | Refills: 3 | Status: SHIPPED | OUTPATIENT
Start: 2022-09-30 | End: 2022-10-11 | Stop reason: SDUPTHER

## 2022-10-11 DIAGNOSIS — Z79.4 TYPE 2 DIABETES MELLITUS WITH HYPERGLYCEMIA, WITH LONG-TERM CURRENT USE OF INSULIN (HCC): ICD-10-CM

## 2022-10-11 DIAGNOSIS — E11.65 TYPE 2 DIABETES MELLITUS WITH HYPERGLYCEMIA, WITH LONG-TERM CURRENT USE OF INSULIN (HCC): ICD-10-CM

## 2022-10-11 RX ORDER — PROCHLORPERAZINE 25 MG/1
SUPPOSITORY RECTAL
Qty: 9 EACH | Refills: 3 | Status: SHIPPED | OUTPATIENT
Start: 2022-10-11 | End: 2022-10-25

## 2022-10-11 NOTE — TELEPHONE ENCOUNTER
Patient needs a refill on his dexcom 6 sensors to be called into [facility] on [address]. Per patient he is out and needs a refill ASAP.

## 2022-10-18 ENCOUNTER — TELEPHONE (OUTPATIENT)
Dept: MEDICAL GROUP | Facility: LAB | Age: 72
End: 2022-10-18
Payer: MEDICARE

## 2022-10-18 NOTE — TELEPHONE ENCOUNTER
Sugey:  Please call Torres, really needs an appointment in the next day or so and needs to be seen.  Regards, Cedrick Perez, DO

## 2022-10-18 NOTE — TELEPHONE ENCOUNTER
PT LVM stating her had diarrhea, asking for some medication sent to his pharmacy. He has been using imodium.

## 2022-10-20 ENCOUNTER — TELEPHONE (OUTPATIENT)
Dept: MEDICAL GROUP | Facility: LAB | Age: 72
End: 2022-10-20
Payer: MEDICARE

## 2022-10-20 DIAGNOSIS — E11.65 TYPE 2 DIABETES MELLITUS WITH HYPERGLYCEMIA, WITH LONG-TERM CURRENT USE OF INSULIN (HCC): ICD-10-CM

## 2022-10-20 DIAGNOSIS — E78.5 DYSLIPIDEMIA: ICD-10-CM

## 2022-10-20 DIAGNOSIS — E03.9 ACQUIRED HYPOTHYROIDISM: ICD-10-CM

## 2022-10-20 DIAGNOSIS — Z79.4 TYPE 2 DIABETES MELLITUS WITH HYPERGLYCEMIA, WITH LONG-TERM CURRENT USE OF INSULIN (HCC): ICD-10-CM

## 2022-10-20 LAB — HBA1C MFR BLD: 8 % (ref 0–5.6)

## 2022-10-20 NOTE — TELEPHONE ENCOUNTER
Pt is in the process of moving to [place].  He is needing a referral to a new endocrinologist in that area.          Dr Ezio Baker endocrinologist  Ph [phone]  Fax [phone] [address] [place] [address]

## 2022-10-21 ENCOUNTER — TELEPHONE (OUTPATIENT)
Dept: MEDICAL GROUP | Facility: LAB | Age: 72
End: 2022-10-21
Payer: MEDICARE

## 2022-10-21 NOTE — TELEPHONE ENCOUNTER
----- Message from Jennifer Arana sent at 10/19/2022  9:03 AM PDT -----  Regarding: Requesting Medication  Patient called stating he was in [place] for work, he had got the stomach flu and was some what over the sickness. He is still having episodes of dry heaving and diarrhea 4-5 times daily. He has been taking 2-3 Imodium daily but doesn't seem to be helping. If there is a prescription that can help if that could be sent to the [facility] on [address] in [place] he will call the pharmacy and get it sent to [place], [place] is what he desired. He does have an upcoming appt but would like to see if there is something he can take sooner. Thank you!

## 2022-10-24 ENCOUNTER — OFFICE VISIT (OUTPATIENT)
Dept: MEDICAL GROUP | Facility: LAB | Age: 72
End: 2022-10-24
Payer: MEDICARE

## 2022-10-24 VITALS
SYSTOLIC BLOOD PRESSURE: 118 MMHG | RESPIRATION RATE: 16 BRPM | BODY MASS INDEX: 30.21 KG/M2 | WEIGHT: 204 LBS | OXYGEN SATURATION: 94 % | DIASTOLIC BLOOD PRESSURE: 60 MMHG | TEMPERATURE: 97.5 F | HEIGHT: 69 IN | HEART RATE: 86 BPM

## 2022-10-24 DIAGNOSIS — E11.65 TYPE 2 DIABETES MELLITUS WITH HYPERGLYCEMIA, WITH LONG-TERM CURRENT USE OF INSULIN (HCC): ICD-10-CM

## 2022-10-24 DIAGNOSIS — Z79.4 TYPE 2 DIABETES MELLITUS WITH HYPERGLYCEMIA, WITH LONG-TERM CURRENT USE OF INSULIN (HCC): ICD-10-CM

## 2022-10-24 PROCEDURE — 99213 OFFICE O/P EST LOW 20 MIN: CPT | Performed by: INTERNAL MEDICINE

## 2022-10-24 ASSESSMENT — FIBROSIS 4 INDEX: FIB4 SCORE: 0.95

## 2022-10-24 NOTE — PROGRESS NOTES
CC: Lopez Calvin is a 71 y.o. male is suffering from   Chief Complaint   Patient presents with    Diabetes     6 months follow up         SUBJECTIVE:  1. Type 2 diabetes mellitus with hyperglycemia, with long-term current use of insulin (HCC)  Torres is here for follow-up, is moving to [facility].  Patient and I have talked to his lab and [place], patient's glycosylated hemoglobin is 8.0        Past social, family, history:   Social History     Tobacco Use    Smoking status: Light Smoker     Packs/day: 0.25     Years: 30.00     Pack years: 7.50     Types: Cigars, Cigarettes     Last attempt to quit: 2013     Years since quittin.8    Smokeless tobacco: Never    Tobacco comments:     3 cigars per week   Substance Use Topics    Alcohol use: Yes     Alcohol/week: 4.8 oz     Types: 7 Glasses of wine, 1 Shots of liquor per week    Drug use: Yes     Types: Marijuana     Comment: CBD oil         MEDICATIONS:    Current Outpatient Medications:     levothyroxine (SYNTHROID) 100 MCG Tab, TAKE 1 TABLET BY MOUTH IN THE MORNING ON AN EMPTY STOMACH, Disp: 90 Tablet, Rfl: 3    aspirin (ASA) 81 MG Chew Tab chewable tablet, Chew 81 mg every day., Disp: , Rfl:     atorvastatin (LIPITOR) 20 MG Tab, Take 1 Tablet by mouth every day., Disp: 90 Tablet, Rfl: 3    Continuous Blood Gluc Sensor (DEXCOM G6 SENSOR) Misc, USE FOR CONTINUOUS BLOOD SUGAR MONITORING. CHANGE EVERY 10 DAYS AS DIRECTED, Disp: 9 Each, Rfl: 3    Continuous Blood Gluc Sensor (DEXCOM G6 SENSOR) Misc, USE FOR CONTINUOUS BLOOD SUGAR MONITORING. CHANGE EVERY 10 DAYS AS DIRECTED, Disp: 9 Each, Rfl: 3    Continuous Blood Gluc Sensor (DEXCOM G6 SENSOR) Misc, USE FOR CONTINUOUS BLOOD SUGAR MONITORING. CHANGE EVERY 10 DAYS AS DIRECTED, Disp: 9 Each, Rfl: 3    SOLIQUA 100-33 UNT-MCG/ML Solution Pen-injector, INJECT 20 UNITS UNDER THE SKIN ONCE DAILY, Disp: 15 mL, Rfl: 3    BD PEN NEEDLE MICRO U/F 32G X 6 MM Misc, USE TO INJECT INSULIN ONCE DAILY,  "Disp: 100 Each, Rfl: 3    vitamin D2, Ergocalciferol, (DRISDOL) 1.25 MG (69737 UT) Cap capsule, TAKE 1 CAPSULE BY MOUTH 1 TIME A WEEK, Disp: 12 Capsule, Rfl: 1    Continuous Blood Gluc Transmit (DEXCOM G6 TRANSMITTER) Misc, 1 Each every 3 months. E11.65 (Patient not taking: Reported on 10/24/2022), Disp: 2 Each, Rfl: 1    Insulin Pen Needle 30G X 5 MM Misc, 1 Each every day., Disp: 90 Each, Rfl: 3    PROBLEMS:  Patient Active Problem List    Diagnosis Date Noted    Vitamin D deficiency 08/31/2021    Type 2 diabetes mellitus with hyperglycemia, with long-term current use of insulin ([facility]) 05/20/2019    ALEJANDRA (obstructive sleep apnea) 05/20/2019    Dyslipidemia 09/10/2016    Acquired hypothyroidism 09/10/2016    Gastroesophageal reflux disease without esophagitis 09/10/2016       REVIEW OF SYSTEMS:  Gen.:  No Nausea, Vomiting, fever, Chills.  Heart: No chest pain.  Lungs:  No shortness of Breath.  Psychological: Robbie unusual Anxiety depression     PHYSICAL EXAM   Constitutional: Alert, cooperative, not in acute distress.  Cardiovascular:  Rate Rhythm is regular without murmurs rubs clicks.     Thorax & Lungs: Clear to auscultation, no wheezing, rhonchi, or rales  HENT: Normocephalic, Atraumatic.  Eyes: PERRLA, EOMI, Conjunctiva normal.   Neck: Trachia is midline no swelling of the thyroid.   Lymphatic: No lymphadenopathy noted.   Neurologic: Alert & oriented x 3, cranial nerves II through XII are intact, Normal motor function, Normal sensory function, No focal deficits noted.   Psychiatric: Affect normal, Judgment normal, Mood normal.     VITAL SIGNS:/60   Pulse 86   Temp 36.4 °C (97.5 °F) (Temporal)   Resp 16   Ht 1.753 m (5' 9\")   Wt 92.5 kg (204 lb)   SpO2 94%   BMI 30.13 kg/m²     Labs: Reviewed    Assessment:                                                     Plan:    1. Type 2 diabetes mellitus with hyperglycemia, with long-term current use of insulin ([facility])  Type 2 diabetes, hemoglobin A1c is 8.0 " "average blood sugar is 183.  Patient had stopped insulin because of \"viral gastroenteritis\"  - Referral to Endocrinology      8.0 183.   "

## 2022-10-25 ENCOUNTER — NON-PROVIDER VISIT (OUTPATIENT)
Dept: ENDOCRINOLOGY | Facility: MEDICAL CENTER | Age: 72
End: 2022-10-25
Attending: INTERNAL MEDICINE
Payer: MEDICARE

## 2022-10-25 VITALS
SYSTOLIC BLOOD PRESSURE: 118 MMHG | HEART RATE: 84 BPM | HEIGHT: 70 IN | BODY MASS INDEX: 28.92 KG/M2 | DIASTOLIC BLOOD PRESSURE: 80 MMHG | OXYGEN SATURATION: 96 % | WEIGHT: 202 LBS

## 2022-10-25 DIAGNOSIS — E55.9 VITAMIN D DEFICIENCY: ICD-10-CM

## 2022-10-25 DIAGNOSIS — Z79.4 TYPE 2 DIABETES MELLITUS WITH HYPERGLYCEMIA, WITH LONG-TERM CURRENT USE OF INSULIN (HCC): ICD-10-CM

## 2022-10-25 DIAGNOSIS — E03.9 ACQUIRED HYPOTHYROIDISM: ICD-10-CM

## 2022-10-25 DIAGNOSIS — E78.5 DYSLIPIDEMIA: ICD-10-CM

## 2022-10-25 DIAGNOSIS — E11.65 TYPE 2 DIABETES MELLITUS WITH HYPERGLYCEMIA, WITH LONG-TERM CURRENT USE OF INSULIN (HCC): ICD-10-CM

## 2022-10-25 LAB — HBA1C MFR BLD: 8 % (ref 0–5.6)

## 2022-10-25 PROCEDURE — 99213 OFFICE O/P EST LOW 20 MIN: CPT | Performed by: INTERNAL MEDICINE

## 2022-10-25 RX ORDER — PROCHLORPERAZINE 25 MG/1
SUPPOSITORY RECTAL
Qty: 9 EACH | Refills: 3 | Status: SHIPPED | OUTPATIENT
Start: 2022-10-25

## 2022-10-25 RX ORDER — INSULIN GLARGINE AND LIXISENATIDE 100; 33 U/ML; UG/ML
INJECTION, SOLUTION SUBCUTANEOUS
Qty: 45 ML | Refills: 3 | Status: SHIPPED | OUTPATIENT
Start: 2022-10-25

## 2022-10-25 RX ORDER — INSULIN GLARGINE AND LIXISENATIDE 100; 33 U/ML; UG/ML
INJECTION, SOLUTION SUBCUTANEOUS
Qty: 45 ML | Refills: 3 | Status: SHIPPED | OUTPATIENT
Start: 2022-10-25 | End: 2022-10-25 | Stop reason: SDUPTHER

## 2022-10-25 RX ORDER — PROCHLORPERAZINE 25 MG/1
1 SUPPOSITORY RECTAL
Qty: 2 EACH | Refills: 1 | Status: SHIPPED | OUTPATIENT
Start: 2022-10-25

## 2022-10-25 RX ORDER — CHLORAL HYDRATE 500 MG
1000 CAPSULE ORAL DAILY
COMMUNITY

## 2022-10-25 ASSESSMENT — FIBROSIS 4 INDEX: FIB4 SCORE: 0.95

## 2022-10-25 NOTE — PROGRESS NOTES
RN-CDE Note    Subjective:   Endocrinology Clinic Progress Note  PCP: Cedrick Perez D.O.    HPI:  Lopez Calvin is a 71 y.o. old patient who is seen today by the Diabetes Nurse Specialist for review of type 2 diabetes and Hypothyroidism.  Recent changes in health: He has been working in [place].  His Dexcom is not working today.  He had a stomach virus on September 25th and stopped his Soliqua and has not gone back on.  He is going to sell his Condo and move to [place] in the future but would like to continue his care in [place].    DM:   Last A1c:   Lab Results   Component Value Date/Time    HBA1C 8 (A) 10/25/2022 12:00 AM      Previous A1c was 7.8 on 4/13/22  A1C GOAL: < 7    Diabetes Medications:   Was on Soliqua 11 units daily      Exercise: 30 minute walk daily  Diet: Hirsch, eggs or oatmeal.  Lunch/Dinner sandwich, vegetables or if on the road salad and fried food/steak.  Patient's body mass index is 29.4 kg/m². Exercise and nutrition counseling were performed at this visit.    Glucose monitoring frequency: Unable to download Dexcom and has not been testing.  Blood sugar in clinic today is 277.    Hypoglycemic episodes: no  Last Retinal Exam: on file and up-to-date  Daily Foot Exam: Yes   Foot Exam:  Monofilament: done  Monofilament testing with a 10 gram force: sensation intact: intact bilaterally  Visual Inspection: Feet without maceration, ulcers, fissures.  Pedal pulses: intact bilaterally   Lab Results   Component Value Date/Time    MALBCRT see below 04/13/2022 07:08 AM    MICROALBUR <1.2 04/13/2022 07:08 AM        ACR Albumin/Creatinine Ratio goal <30     HTN:   Blood pressure goal <140/<80 .   Currently Rx ACE/ARB: No     Dyslipidemia:    Lab Results   Component Value Date/Time    CHOLSTRLTOT 156 04/13/2022 07:09 AM    LDL 85 04/13/2022 07:09 AM    HDL 50 04/13/2022 07:09 AM    TRIGLYCERIDE 103 04/13/2022 07:09 AM         Currently Rx Statin: Yes     He  reports that he has been smoking  cigars. He has a 7.50 pack-year smoking history. He has never used smokeless tobacco.      Plan:     Discussed and educated on:   - All medications, side effects and compliance (discussed carefully)  - Annual eye examinations at Ophthalmology  - Home glucose monitoring emphasized  - Weight control and daily exercise    Recommended medication changes: He will start  back on Soliqua 15 units and increase by 2 units every 3 days until his fasting blood sugar is under 130.  He was given a Dexcom sensor to get back on.  He was unable to get his Soliqua and Dexcom supplies in [place] and pick them up at [facility] before he goes back to [place]..  He will follow up with Dr. Baltazar in 6 months and have his lab work done before that appointment.

## 2022-11-08 ENCOUNTER — PATIENT MESSAGE (OUTPATIENT)
Dept: HEALTH INFORMATION MANAGEMENT | Facility: OTHER | Age: 72
End: 2022-11-08

## 2022-12-13 NOTE — TELEPHONE ENCOUNTER
Pt is needing a referral renewal for Dr Baltazar.  Sees him for diabetes.   Include Pregnancy/Lactation Warning?: No

## 2022-12-25 DIAGNOSIS — E55.9 VITAMIN D DEFICIENCY: ICD-10-CM

## 2022-12-26 RX ORDER — ERGOCALCIFEROL 1.25 MG/1
CAPSULE ORAL
Qty: 12 CAPSULE | Refills: 1 | Status: SHIPPED | OUTPATIENT
Start: 2022-12-26 | End: 2023-05-29

## 2023-01-13 ENCOUNTER — TELEPHONE (OUTPATIENT)
Dept: ENDOCRINOLOGY | Facility: MEDICAL CENTER | Age: 73
End: 2023-01-13

## 2023-01-16 ENCOUNTER — APPOINTMENT (OUTPATIENT)
Dept: ENDOCRINOLOGY | Facility: MEDICAL CENTER | Age: 73
End: 2023-01-16
Attending: INTERNAL MEDICINE
Payer: MEDICARE

## 2023-03-14 DIAGNOSIS — Z79.4 TYPE 2 DIABETES MELLITUS WITH HYPERGLYCEMIA, WITH LONG-TERM CURRENT USE OF INSULIN (HCC): ICD-10-CM

## 2023-03-14 DIAGNOSIS — E11.65 TYPE 2 DIABETES MELLITUS WITH HYPERGLYCEMIA, WITH LONG-TERM CURRENT USE OF INSULIN (HCC): ICD-10-CM

## 2023-03-14 RX ORDER — ATORVASTATIN CALCIUM 20 MG/1
20 TABLET, FILM COATED ORAL DAILY
Qty: 90 TABLET | Refills: 3 | Status: SHIPPED | OUTPATIENT
Start: 2023-03-14 | End: 2023-12-26

## 2023-03-14 NOTE — TELEPHONE ENCOUNTER
Received request via: Pharmacy    Was the patient seen in the last year in this department? Yes  LOV 10/24/2022  Does the patient have an active prescription (recently filled or refills available) for medication(s) requested? No    Does the patient have FDC Plus and need 100 day supply (blood pressure, diabetes and cholesterol meds only)? Patient does not have SCP

## 2023-04-26 ENCOUNTER — TELEPHONE (OUTPATIENT)
Dept: HEALTH INFORMATION MANAGEMENT | Facility: OTHER | Age: 73
End: 2023-04-26
Payer: MEDICARE

## 2023-05-02 ENCOUNTER — APPOINTMENT (OUTPATIENT)
Dept: ENDOCRINOLOGY | Facility: MEDICAL CENTER | Age: 73
End: 2023-05-02
Attending: INTERNAL MEDICINE
Payer: MEDICARE

## 2023-11-08 ENCOUNTER — DOCUMENTATION (OUTPATIENT)
Dept: HEALTH INFORMATION MANAGEMENT | Facility: OTHER | Age: 73
End: 2023-11-08
Payer: MEDICARE

## 2023-12-23 DIAGNOSIS — E11.65 TYPE 2 DIABETES MELLITUS WITH HYPERGLYCEMIA, WITH LONG-TERM CURRENT USE OF INSULIN (HCC): ICD-10-CM

## 2023-12-23 DIAGNOSIS — Z79.4 TYPE 2 DIABETES MELLITUS WITH HYPERGLYCEMIA, WITH LONG-TERM CURRENT USE OF INSULIN (HCC): ICD-10-CM

## 2023-12-26 RX ORDER — ATORVASTATIN CALCIUM 20 MG/1
20 TABLET, FILM COATED ORAL DAILY
Qty: 90 TABLET | Refills: 3 | Status: SHIPPED | OUTPATIENT
Start: 2023-12-26

## 2025-03-23 DIAGNOSIS — E11.65 TYPE 2 DIABETES MELLITUS WITH HYPERGLYCEMIA, WITH LONG-TERM CURRENT USE OF INSULIN (HCC): ICD-10-CM

## 2025-03-23 DIAGNOSIS — Z79.4 TYPE 2 DIABETES MELLITUS WITH HYPERGLYCEMIA, WITH LONG-TERM CURRENT USE OF INSULIN (HCC): ICD-10-CM

## 2025-03-24 RX ORDER — ATORVASTATIN CALCIUM 20 MG/1
20 TABLET, FILM COATED ORAL DAILY
Qty: 90 TABLET | Refills: 3 | Status: SHIPPED | OUTPATIENT
Start: 2025-03-24

## 2025-03-24 NOTE — TELEPHONE ENCOUNTER
Received request via: Pharmacy    Was the patient seen in the last year in this department? No    Does the patient have an active prescription (recently filled or refills available) for medication(s) requested? No    [facility] Name[facility]    Does the patient have [facility] Plus and need 100-day supply? (This applies to ALL medications) Patient does not have SCP

## (undated) DEVICE — BLADE SURGICAL CLIPPER - (50EA/CA)

## (undated) DEVICE — OBTURATOR 8MM BLADELESS - (24EA/BX) DA VINCI

## (undated) DEVICE — TROCAR 12 X 150 KII FIOS Z THREAD (6EA/BX)

## (undated) DEVICE — SUCTION INSTRUMENT YANKAUER BULBOUS TIP W/O VENT (50EA/CA)

## (undated) DEVICE — SLEEVE, VASO, THIGH, MED

## (undated) DEVICE — KIT ANESTHESIA W/CIRCUIT & 3/LT BAG W/FILTER (20EA/CA)

## (undated) DEVICE — MASK ANESTHESIA ADULT  - (100/CA)

## (undated) DEVICE — NEEDLE INSFL 120MM 14GA VRRS - (20/BX)

## (undated) DEVICE — PROTECTOR ULNA NERVE - (36PR/CA)

## (undated) DEVICE — GLOVE BIOGEL PI ORTHO SZ 7.5 PF LF (40PR/BX)

## (undated) DEVICE — KIT ROOM DECONTAMINATION

## (undated) DEVICE — SODIUM CHL IRRIGATION 0.9% 1000ML (12EA/CA)

## (undated) DEVICE — SUTURE 4-0 MONOCRYL PLUS PS-2 - 27 INCH (36/BX)

## (undated) DEVICE — SET LEADWIRE 5 LEAD BEDSIDE DISPOSABLE ECG (1SET OF 5/EA)

## (undated) DEVICE — CANISTER SUCTION 3000ML MECHANICAL FILTER AUTO SHUTOFF MEDI-VAC NONSTERILE LF DISP  (40EA/CA)

## (undated) DEVICE — GOWN WARMING STANDARD FLEX - (30/CA)

## (undated) DEVICE — SET EXTENSION WITH 2 PORTS (48EA/CA) ***PART #2C8610 IS A SUBSTITUTE*****

## (undated) DEVICE — CHLORAPREP 26 ML APPLICATOR - ORANGE TINT(25/CA)

## (undated) DEVICE — COVER LIGHT HANDLE FLEXIBLE - SOFT (2EA/PK 80PK/CA)

## (undated) DEVICE — FORCEP BIPOLAR FENESTRATED - DA VINCI 10X'S REUSABLE

## (undated) DEVICE — LEAD SET 6 DISP. EKG NIHON KOHDEN (100EA/CA) [9859].

## (undated) DEVICE — ROBOTIC SURGERY SERVICES

## (undated) DEVICE — SENSOR SPO2 NEO LNCS ADHESIVE (20/BX) SEE USER NOTES

## (undated) DEVICE — CANNULA SEAL 8.5-13MM (10/BX)

## (undated) DEVICE — HEAD HOLDER JUNIOR/ADULT

## (undated) DEVICE — DRAPE 3 ARM DA VANCI SI - (5EA/CA)

## (undated) DEVICE — SUTURE GENERAL

## (undated) DEVICE — TUBE E-T HI-LO CUFF 7.0MM (10EA/PK)

## (undated) DEVICE — ELECTRODE DUAL RETURN W/ CORD - (50/PK)

## (undated) DEVICE — Device

## (undated) DEVICE — GLOVE BIOGEL PI ORTHO SZ 8 PF LF (40PR/BX)

## (undated) DEVICE — NEEDLE DRIVER SUTURE CUT - DA VINCI 10X'S REUSABLE

## (undated) DEVICE — LACTATED RINGERS INJ 1000 ML - (14EA/CA 60CA/PF)

## (undated) DEVICE — COVER TIP ENDOWRIST HOT SHEAR - (10EA/BX) DA VINCI

## (undated) DEVICE — TUBE CONNECT SUCTION CLEAR 120 X 1/4" (50EA/CA)"

## (undated) DEVICE — ELECTRODE 850 FOAM ADHESIVE - HYDROGEL RADIOTRNSPRNT (50/PK)

## (undated) DEVICE — TUBING CLEARLINK DUO-VENT - C-FLO (48EA/CA)

## (undated) DEVICE — SUTURE2-0 20CM STRATAFIX SPIRAL PDS CT-1 (12EA/BX)

## (undated) DEVICE — SYSTEM CLEARIFY VISUALIZATION (10EA/PK)

## (undated) DEVICE — SUTURE 0 COATED VICRYL 6-18IN - (12PK/BX)